# Patient Record
Sex: FEMALE | Race: WHITE | NOT HISPANIC OR LATINO | Employment: FULL TIME | URBAN - METROPOLITAN AREA
[De-identification: names, ages, dates, MRNs, and addresses within clinical notes are randomized per-mention and may not be internally consistent; named-entity substitution may affect disease eponyms.]

---

## 2021-04-15 ENCOUNTER — OFFICE VISIT (OUTPATIENT)
Dept: INTERNAL MEDICINE | Facility: CLINIC | Age: 56
End: 2021-04-15
Payer: OTHER GOVERNMENT

## 2021-04-15 VITALS
SYSTOLIC BLOOD PRESSURE: 108 MMHG | HEART RATE: 92 BPM | DIASTOLIC BLOOD PRESSURE: 72 MMHG | TEMPERATURE: 98.6 F | BODY MASS INDEX: 31.67 KG/M2 | HEIGHT: 68 IN | OXYGEN SATURATION: 99 % | WEIGHT: 209 LBS

## 2021-04-15 DIAGNOSIS — J30.2 SEASONAL ALLERGIES: ICD-10-CM

## 2021-04-15 DIAGNOSIS — I10 ESSENTIAL HYPERTENSION: Primary | ICD-10-CM

## 2021-04-15 DIAGNOSIS — R07.89 CHEST DISCOMFORT: ICD-10-CM

## 2021-04-15 PROCEDURE — 99203 OFFICE O/P NEW LOW 30 MIN: CPT | Performed by: INTERNAL MEDICINE

## 2021-04-15 PROCEDURE — 3008F BODY MASS INDEX DOCD: CPT | Performed by: INTERNAL MEDICINE

## 2021-04-15 RX ORDER — HYDROCHLOROTHIAZIDE 25 MG/1
25 TABLET ORAL
COMMUNITY
Start: 2021-01-26 | End: 2021-05-18 | Stop reason: ALTCHOICE

## 2021-04-15 RX ORDER — LOSARTAN POTASSIUM 25 MG/1
25 TABLET ORAL DAILY
Qty: 90 TABLET | Refills: 1 | Status: SHIPPED | OUTPATIENT
Start: 2021-04-15 | End: 2021-05-04

## 2021-04-15 RX ORDER — MONTELUKAST SODIUM 10 MG/1
10 TABLET ORAL DAILY
COMMUNITY

## 2021-04-15 RX ORDER — ALBUTEROL SULFATE 90 UG/1
1-2 INHALANT RESPIRATORY (INHALATION) EVERY 4 HOURS PRN
COMMUNITY
Start: 2021-01-20 | End: 2021-05-04

## 2021-04-15 RX ORDER — LEVOCETIRIZINE DIHYDROCHLORIDE 5 MG/1
5 TABLET, FILM COATED ORAL DAILY
COMMUNITY
End: 2021-09-08 | Stop reason: ALTCHOICE

## 2021-04-15 NOTE — PROGRESS NOTES
Subjective      Patient ID: Preeti Hyde is a 55 y.o. female.    HPI   She is here to establish care with a new doctor - has hx bronchitis and allergies - hypertension   On HCTZ .  Recent blood pressures are in the 146/96 range today.  Recently had some chest discomfort for a day - occurred in a stresful situation and has not had a recurrence.. Father with CAD in his 50's - mother with non hodgkins lymphoma. Had chemical stress test in 2018. Is very stressed on on her job.    The following have been reviewed and updated as appropriate in this visit:  Allergies  Meds  Problems       Past Medical History:   Diagnosis Date   • Allergies    • GERD (gastroesophageal reflux disease)    • Hypertension      Past Surgical History:   Procedure Laterality Date   • TONSILLECTOMY     • WISDOM TOOTH EXTRACTION       Family History   Problem Relation Age of Onset   • Lymphoma Biological Mother    • Heart attack Biological Father    • Aortic aneurysm Biological Father    • Hypertension Biological Father      Social History     Socioeconomic History   • Marital status:      Spouse name: Not on file   • Number of children: Not on file   • Years of education: Not on file   • Highest education level: Not on file   Occupational History   • Not on file   Tobacco Use   • Smoking status: Never Smoker   • Smokeless tobacco: Never Used   Vaping Use   • Vaping Use: Never used   Substance and Sexual Activity   • Alcohol use: Yes   • Drug use: Never   • Sexual activity: Yes     Partners: Male   Other Topics Concern   • Not on file   Social History Narrative   • Not on file     Social Determinants of Health     Financial Resource Strain:    • Difficulty of Paying Living Expenses:    Food Insecurity:    • Worried About Running Out of Food in the Last Year:    • Ran Out of Food in the Last Year:    Transportation Needs:    • Lack of Transportation (Medical):    • Lack of Transportation (Non-Medical):    Physical Activity:    • Days of  "Exercise per Week:    • Minutes of Exercise per Session:    Stress:    • Feeling of Stress :    Social Connections:    • Frequency of Communication with Friends and Family:    • Frequency of Social Gatherings with Friends and Family:    • Attends Sabianist Services:    • Active Member of Clubs or Organizations:    • Attends Club or Organization Meetings:    • Marital Status:    Intimate Partner Violence:    • Fear of Current or Ex-Partner:    • Emotionally Abused:    • Physically Abused:    • Sexually Abused:        Review of Systems    Allergies   Allergen Reactions   • Demerol [Meperidine] Other (see comments)     Flushing after endoscopy   • Pneumococcal Vaccine Other (see comments)     High fever     Current Outpatient Medications   Medication Sig Dispense Refill   • albuterol HFA (PROAIR HFA) 90 mcg/actuation inhaler Inhale 1-2 puffs every 4 (four) hours as needed.     • hydrochlorothiazide (HYDRODIURIL) 25 mg tablet Take 25 mg by mouth once daily.     • levocetirizine (XYZAL) 5 mg tablet Take 5 mg by mouth daily.     • montelukast (SINGULAIR) 10 mg tablet Take 10 mg by mouth daily.     • losartan (COZAAR) 25 mg tablet Take 1 tablet (25 mg total) by mouth daily. 90 tablet 1     No current facility-administered medications for this visit.       Objective   Vitals:    04/15/21 0827   BP: 108/72   BP Location: Right upper arm   Patient Position: Sitting   Pulse: 92   Temp: 37 °C (98.6 °F)   SpO2: 99%   Weight: 94.8 kg (209 lb)   Height: 1.727 m (5' 8\")       Physical Exam  Vitals and nursing note reviewed.   Constitutional:       Appearance: She is well-developed.   HENT:      Head: Normocephalic and atraumatic.   Neck:      Thyroid: No thyromegaly.   Cardiovascular:      Rate and Rhythm: Normal rate and regular rhythm.      Heart sounds: Normal heart sounds. No murmur heard.     Pulmonary:      Effort: Pulmonary effort is normal.      Breath sounds: Normal breath sounds.   Abdominal:      General: Bowel sounds " are normal.      Palpations: Abdomen is soft. There is no mass.      Tenderness: There is no abdominal tenderness.   Musculoskeletal:      Cervical back: Normal range of motion and neck supple.   Skin:     General: Skin is warm and dry.   Neurological:      Mental Status: She is alert and oriented to person, place, and time.   Psychiatric:         Behavior: Behavior normal.         Thought Content: Thought content normal.         Judgment: Judgment normal.         Assessment/Plan   Problem List Items Addressed This Visit        Nervous    Chest discomfort     I suspect her episode of chest discomfort was esophageal spasm however need to r/o cardiac ischemia given her hx hypertension and family hx CAD - will follow with DR.Andrea Blount of cardiology            Circulatory    Essential hypertension - Primary     Blood pressure is borderline - will continue losartan and have her send me her blood pressure readings over the next several weeks- goal is less than 130/80 - maintain low sodium diet         Relevant Medications    hydrochlorothiazide (HYDRODIURIL) 25 mg tablet    losartan (COZAAR) 25 mg tablet    Other Relevant Orders    CBC    Comprehensive metabolic panel    Lipid panel    TSH 3rd Generation       Other    Seasonal allergies     Has multiple episodes of bronchitis - will have her follow with dr.Alyson Morrow of allergy/immunology  Consider quantitative immunoglobulins  Continue xyzal/montelukast and albuterol inhaler prn               Rosa Smith MD    4/17/2021

## 2021-04-15 NOTE — PATIENT INSTRUCTIONS
Follow with Dr. Guicho Blount Send me your blood pressure readings in the next two weeks   Goal is less than 130/80  Dr. Agrawal ( ortho - foot)   Dr. Sumi Morrow of allergy / immunology

## 2021-04-17 PROBLEM — R07.89 CHEST DISCOMFORT: Status: ACTIVE | Noted: 2021-04-17

## 2021-04-17 PROBLEM — J30.2 SEASONAL ALLERGIES: Status: ACTIVE | Noted: 2021-04-17

## 2021-04-17 NOTE — ASSESSMENT & PLAN NOTE
Has multiple episodes of bronchitis - will have her follow with dr.Alyson Morrow of allergy/immunology  Consider quantitative immunoglobulins  Continue xyzal/montelukast and albuterol inhaler prn

## 2021-04-17 NOTE — ASSESSMENT & PLAN NOTE
Blood pressure is borderline - will continue losartan and have her send me her blood pressure readings over the next several weeks- goal is less than 130/80 - maintain low sodium diet

## 2021-04-17 NOTE — ASSESSMENT & PLAN NOTE
I suspect her episode of chest discomfort was esophageal spasm however need to r/o cardiac ischemia given her hx hypertension and family hx CAD - will follow with DR.Andrea Blount of cardiology

## 2021-04-20 ENCOUNTER — TELEPHONE (OUTPATIENT)
Dept: SCHEDULING | Facility: CLINIC | Age: 56
End: 2021-04-20

## 2021-04-20 NOTE — TELEPHONE ENCOUNTER
New Patient Appointment Request    Name of caller: Preeti Hyde    Diagnosis: Cardiac eval with stress test    Referred by: PCP    Previous Cardiologist name and phone number:NA    Best contact number:P#[phone]    Additional notes: Pt appt was not schd b/c pt states her pcp wants her to get stress test. No orders in chart.   Pt wants to be seen in [facility]

## 2021-04-21 NOTE — TELEPHONE ENCOUNTER
Called and LMOM for pt to call me back to pick which day and time is good for her NPV, offered May 25th @ 1:30 or May 27th either 9am or 1:30. Also let her know Dr. Blount will not order a stress test until after she is seen for her NPV

## 2021-04-21 NOTE — TELEPHONE ENCOUNTER
Pt returning missed  Call pt states she works at [id number] and hard to take calls. Pt states ok to leave detailed msg .   Pt #[phone]

## 2021-04-22 NOTE — TELEPHONE ENCOUNTER
Patient called me back and I was able to make her new patient visit on May 27th @ 9am, new patient visit paperwork has been mailed out and emailed

## 2021-05-04 ENCOUNTER — APPOINTMENT (EMERGENCY)
Dept: RADIOLOGY | Facility: HOSPITAL | Age: 56
End: 2021-05-04
Attending: EMERGENCY MEDICINE
Payer: OTHER GOVERNMENT

## 2021-05-04 ENCOUNTER — HOSPITAL ENCOUNTER (EMERGENCY)
Facility: HOSPITAL | Age: 56
Discharge: HOME | End: 2021-05-04
Attending: EMERGENCY MEDICINE
Payer: OTHER GOVERNMENT

## 2021-05-04 VITALS
TEMPERATURE: 98.6 F | DIASTOLIC BLOOD PRESSURE: 62 MMHG | WEIGHT: 200 LBS | HEIGHT: 68 IN | OXYGEN SATURATION: 98 % | HEART RATE: 79 BPM | SYSTOLIC BLOOD PRESSURE: 158 MMHG | BODY MASS INDEX: 30.31 KG/M2 | RESPIRATION RATE: 16 BRPM

## 2021-05-04 DIAGNOSIS — R31.9 URINARY TRACT INFECTION WITH HEMATURIA, SITE UNSPECIFIED: Primary | ICD-10-CM

## 2021-05-04 DIAGNOSIS — N39.0 URINARY TRACT INFECTION WITH HEMATURIA, SITE UNSPECIFIED: Primary | ICD-10-CM

## 2021-05-04 LAB
ALBUMIN SERPL-MCNC: 4.3 G/DL (ref 3.4–5)
ALP SERPL-CCNC: 55 IU/L (ref 35–126)
ALT SERPL-CCNC: 26 IU/L (ref 11–54)
ANION GAP SERPL CALC-SCNC: 10 MEQ/L (ref 3–15)
AST SERPL-CCNC: 24 IU/L (ref 15–41)
BACTERIA URNS QL MICRO: ABNORMAL /HPF
BASOPHILS # BLD: 0.05 K/UL (ref 0.01–0.1)
BASOPHILS NFR BLD: 0.8 %
BILIRUB SERPL-MCNC: 0.4 MG/DL (ref 0.3–1.2)
BILIRUB UR QL STRIP.AUTO: NEGATIVE MG/DL
BUN SERPL-MCNC: 20 MG/DL (ref 8–20)
CALCIUM SERPL-MCNC: 8.6 MG/DL (ref 8.9–10.3)
CHLORIDE SERPL-SCNC: 101 MEQ/L (ref 98–109)
CLARITY UR REFRACT.AUTO: CLEAR
CO2 SERPL-SCNC: 26 MEQ/L (ref 22–32)
COLOR UR AUTO: YELLOW
CREAT SERPL-MCNC: 0.9 MG/DL (ref 0.6–1.1)
DIFFERENTIAL METHOD BLD: NORMAL
EOSINOPHIL # BLD: 0.2 K/UL (ref 0.04–0.36)
EOSINOPHIL NFR BLD: 3.4 %
ERYTHROCYTE [DISTWIDTH] IN BLOOD BY AUTOMATED COUNT: 12.3 % (ref 11.7–14.4)
GFR SERPL CREATININE-BSD FRML MDRD: >60 ML/MIN/1.73M*2
GLUCOSE SERPL-MCNC: 105 MG/DL (ref 70–99)
GLUCOSE UR STRIP.AUTO-MCNC: NEGATIVE MG/DL
HCT VFR BLDCO AUTO: 43.6 % (ref 35–45)
HGB BLD-MCNC: 14.7 G/DL (ref 11.8–15.7)
HGB UR QL STRIP.AUTO: NEGATIVE
HYALINE CASTS #/AREA URNS LPF: ABNORMAL /LPF
IMM GRANULOCYTES # BLD AUTO: 0.01 K/UL (ref 0–0.08)
IMM GRANULOCYTES NFR BLD AUTO: 0.2 %
KETONES UR STRIP.AUTO-MCNC: NEGATIVE MG/DL
LEUKOCYTE ESTERASE UR QL STRIP.AUTO: ABNORMAL
LYMPHOCYTES # BLD: 1.96 K/UL (ref 1.2–3.5)
LYMPHOCYTES NFR BLD: 33 %
MCH RBC QN AUTO: 31.9 PG (ref 28–33.2)
MCHC RBC AUTO-ENTMCNC: 33.7 G/DL (ref 32.2–35.5)
MCV RBC AUTO: 94.6 FL (ref 83–98)
MONOCYTES # BLD: 0.38 K/UL (ref 0.28–0.8)
MONOCYTES NFR BLD: 6.4 %
NEUTROPHILS # BLD: 3.34 K/UL (ref 1.7–7)
NEUTS SEG NFR BLD: 56.2 %
NITRITE UR QL STRIP.AUTO: NEGATIVE
NRBC BLD-RTO: 0 %
PDW BLD AUTO: 10.6 FL (ref 9.4–12.3)
PH UR STRIP.AUTO: 6 [PH]
PLATELET # BLD AUTO: 301 K/UL (ref 150–369)
POTASSIUM SERPL-SCNC: 3.3 MEQ/L (ref 3.6–5.1)
PROT SERPL-MCNC: 6.6 G/DL (ref 6–8.2)
PROT UR QL STRIP.AUTO: NEGATIVE
RBC # BLD AUTO: 4.61 M/UL (ref 3.93–5.22)
RBC #/AREA URNS HPF: ABNORMAL /HPF
SODIUM SERPL-SCNC: 137 MEQ/L (ref 136–144)
SP GR UR REFRACT.AUTO: 1.03
SQUAMOUS URNS QL MICRO: 1 /HPF
UROBILINOGEN UR STRIP-ACNC: 0.2 EU/DL
WBC # BLD AUTO: 5.94 K/UL (ref 3.8–10.5)
WBC #/AREA URNS HPF: ABNORMAL /HPF

## 2021-05-04 PROCEDURE — 87086 URINE CULTURE/COLONY COUNT: CPT | Performed by: EMERGENCY MEDICINE

## 2021-05-04 PROCEDURE — 81001 URINALYSIS AUTO W/SCOPE: CPT | Performed by: EMERGENCY MEDICINE

## 2021-05-04 PROCEDURE — 63600105 HC IODINE BASED CONTRAST: Performed by: EMERGENCY MEDICINE

## 2021-05-04 PROCEDURE — 80053 COMPREHEN METABOLIC PANEL: CPT

## 2021-05-04 PROCEDURE — 85025 COMPLETE CBC W/AUTO DIFF WBC: CPT | Performed by: EMERGENCY MEDICINE

## 2021-05-04 PROCEDURE — 85025 COMPLETE CBC W/AUTO DIFF WBC: CPT

## 2021-05-04 PROCEDURE — 63700000 HC SELF-ADMINISTRABLE DRUG: Performed by: EMERGENCY MEDICINE

## 2021-05-04 PROCEDURE — 81003 URINALYSIS AUTO W/O SCOPE: CPT | Performed by: EMERGENCY MEDICINE

## 2021-05-04 PROCEDURE — G1004 CDSM NDSC: HCPCS

## 2021-05-04 PROCEDURE — 99284 EMERGENCY DEPT VISIT MOD MDM: CPT | Mod: 25

## 2021-05-04 PROCEDURE — 36415 COLL VENOUS BLD VENIPUNCTURE: CPT

## 2021-05-04 PROCEDURE — 80053 COMPREHEN METABOLIC PANEL: CPT | Performed by: EMERGENCY MEDICINE

## 2021-05-04 RX ORDER — CEFUROXIME AXETIL 500 MG/1
500 TABLET ORAL 2 TIMES DAILY
Qty: 14 TABLET | Refills: 0 | Status: SHIPPED | OUTPATIENT
Start: 2021-05-04 | End: 2021-05-11

## 2021-05-04 RX ORDER — CEFUROXIME AXETIL 250 MG/1
500 TABLET ORAL EVERY 12 HOURS
Status: DISCONTINUED | OUTPATIENT
Start: 2021-05-04 | End: 2021-05-05 | Stop reason: HOSPADM

## 2021-05-04 RX ORDER — PHENAZOPYRIDINE HYDROCHLORIDE 95 MG/1
95 TABLET ORAL
Status: DISCONTINUED | OUTPATIENT
Start: 2021-05-04 | End: 2021-05-05 | Stop reason: HOSPADM

## 2021-05-04 RX ORDER — PHENAZOPYRIDINE HYDROCHLORIDE 200 MG/1
200 TABLET, FILM COATED ORAL 3 TIMES DAILY
Qty: 6 TABLET | Refills: 0 | Status: SHIPPED | OUTPATIENT
Start: 2021-05-04 | End: 2021-05-06

## 2021-05-04 RX ADMIN — CEFUROXIME AXETIL 500 MG: 250 TABLET ORAL at 22:32

## 2021-05-04 RX ADMIN — PHENAZOPYRIDINE HYDROCHLORIDE 95 MG: 95 TABLET ORAL at 22:32

## 2021-05-04 RX ADMIN — IOHEXOL 120 ML: 300 INJECTION, SOLUTION INTRAVENOUS at 21:25

## 2021-05-04 ASSESSMENT — ENCOUNTER SYMPTOMS
WEAKNESS: 0
CHILLS: 0
FREQUENCY: 1
SHORTNESS OF BREATH: 0
FLANK PAIN: 0
DIARRHEA: 0
DYSURIA: 0
VOMITING: 0
NUMBNESS: 0
HEMATURIA: 0
ABDOMINAL PAIN: 1
FEVER: 0
COLOR CHANGE: 0
BACK PAIN: 0
COUGH: 0

## 2021-05-05 ENCOUNTER — TELEPHONE (OUTPATIENT)
Dept: INTERNAL MEDICINE | Facility: CLINIC | Age: 56
End: 2021-05-05

## 2021-05-05 NOTE — DISCHARGE INSTRUCTIONS
You were treated in the ER today for urinary urgency and abdominal pain.  You had evaluation in the ER that included a urinalysis and a CT scan of your abdomen and blood work.  As discussed, this is likely still continued urinary tract infection, CAT scan was negative for any more extensive involvement like infection of your kidneys.    You were given a different class of antibiotics to take.  Continue taking this as directed for the next 7 days.  Use the Pyridium for the next 2 days as needed.    Follow-up with your PCP when you return home later this week; additionally be referred to urologist if they see necessary in your hometown.    Return to the ER for new or worsening symptoms.

## 2021-05-05 NOTE — ED ATTESTATION NOTE
The patient was evaluated and managed by the physician assistant / nurse practitioner.  55-year-old female seen in urgent care.  She was diagnosed with a UTI.  She was seen recently was treated for UTI and was feeling better however the symptoms have returned.  I discussed the case with the physician assistant.  Went over history and physical findings.  Will check urine and send off in a specimen.  In the meantime will treat.     Hitesh Harry MD  05/04/21 0420

## 2021-05-05 NOTE — ED PROVIDER NOTES
Emergency Medicine Note  HPI   HISTORY OF PRESENT ILLNESS     55-year-old female with significant PMH of HTN, GERD, and allergies with a recent urinary tract infection presents to the ED today complaining of persistent urinary urgency and frequency and lower abdominal pain.  4/25 patient was evaluated in urgent care and diagnosed with urinary tract infection; she completed 7 days of Bactrim DS twice daily.  She initially took Pyridium for the first 2 days as well; reports that her urgency and frequency significantly improved but then returned 2 to 3 days ago.  She had another visit at an urgent care on 5/2 and started taking Macrobid; she has only taken this for about 1 day but has persistent urgency which prompted her ED visit.  [place] [facility] but is frequently in this area for work.  She reports that her lower abdominal pain is a minor pressure feeling.  Denies it to radiate to her back or any pain in her flank, denies dysuria/hematuria, fever/chills, nausea or vomiting, chest pain or trouble breathing, changes in bowel.            Patient History   PAST HISTORY     Reviewed from Nursing Triage:      Past Medical History:   Diagnosis Date   • Allergies    • GERD (gastroesophageal reflux disease)    • Hypertension        Past Surgical History:   Procedure Laterality Date   • TONSILLECTOMY     • WISDOM TOOTH EXTRACTION         Family History   Problem Relation Age of Onset   • Lymphoma Biological Mother    • Heart attack Biological Father    • Aortic aneurysm Biological Father    • Hypertension Biological Father        Social History     Tobacco Use   • Smoking status: Never Smoker   • Smokeless tobacco: Never Used   Vaping Use   • Vaping Use: Never used   Substance Use Topics   • Alcohol use: Yes   • Drug use: Never         Review of Systems   REVIEW OF SYSTEMS     Review of Systems   Constitutional: Negative for chills and fever.   Respiratory: Negative for cough and  shortness of breath.    Cardiovascular: Negative for chest pain.   Gastrointestinal: Positive for abdominal pain. Negative for diarrhea and vomiting.   Genitourinary: Positive for frequency. Negative for dysuria, flank pain and hematuria.   Musculoskeletal: Negative for back pain.   Skin: Negative for color change.   Neurological: Negative for weakness and numbness.         VITALS     ED Vitals    Date/Time Temp Pulse Resp BP SpO2 [facility]   05/04/21 2234 37 °C (98.6 °F) 79 16 158/62 98 % [facility]   05/04/21 2022 36.3 °C (97.4 °F) 81 18 162/67 97 % [facility]        Pulse Ox %: 97 % (05/04/21 2230)  Pulse Ox Interpretation: Normal (05/04/21 2230)  Heart Rate: 81 (05/04/21 2230)        Physical Exam   PHYSICAL EXAM     Physical Exam  Vitals and nursing note reviewed.   Constitutional:       General: She is not in acute distress.     Appearance: She is well-developed.      Comments: Well-appearing; in NAD   HENT:      Head: Normocephalic and atraumatic.   Eyes:      Conjunctiva/sclera: Conjunctivae normal.   Cardiovascular:      Rate and Rhythm: Normal rate and regular rhythm.      Heart sounds: No murmur heard.     Pulmonary:      Effort: Pulmonary effort is normal. No respiratory distress.      Breath sounds: Normal breath sounds.   Abdominal:      Palpations: Abdomen is soft.      Tenderness: There is no abdominal tenderness.      Comments: Soft nondistended  Lower abdominal tenderness without rebound or guarding   Musculoskeletal:         General: No swelling.      Cervical back: Neck supple.      Comments: No CVA tenderness bilaterally   Skin:     General: Skin is warm and dry.   Neurological:      General: No focal deficit present.      Mental Status: She is alert and oriented to person, place, and time.           PROCEDURES     Procedures     DATA     Results     Procedure Component Value Units Date/Time    UA with reflex culture [[id number]]  (Abnormal) Collected: 05/04/21 2043    Specimen: Urine, Clean Catch Updated: 05/04/21  2107    Narrative:      The following orders were created for panel order UA with reflex culture.  Procedure                               Abnormality         Status                     ---------                               -----------         ------                     UA Reflex to Culture (Ma...[[id number]]  Abnormal            Final result               UA Microscopic[[id number]]               Abnormal            Final result                 Please view results for these tests on the individual orders.    UA Microscopic [[id number]]  (Abnormal) Collected: 05/04/21 2043    Specimen: Urine, Clean Catch Updated: 05/04/21 2107     RBC, Urine 0 TO 4 /HPF      WBC, Urine 10 TO 20 /HPF      Squamous Epithelial +1 /hpf      Hyaline Cast 0 TO 2 /lpf      Bacteria, Urine None Seen /HPF     UA Reflex to Culture (Macroscopic) [[id number]]  (Abnormal) Collected: 05/04/21 2043    Specimen: Urine, Clean Catch Updated: 05/04/21 2105     Color, Urine Yellow     Clarity, Urine Clear     Specific Gravity, Urine 1.026     pH, Urine 6.0     Leukocyte Esterase Trace     Nitrite, Urine Negative     Protein, Urine Negative     Glucose, Urine Negative mg/dL      Ketones, Urine Negative mg/dL      Urobilinogen, Urine 0.2 EU/dL      Bilirubin, Urine Negative mg/dL      Blood, Urine Negative     Comment: The sensitivity of the occult blood test is equivalent to approximately 4 intact RBC/HPF.       Comprehensive metabolic panel [[id number]]  (Abnormal) Collected: 05/04/21 2024    Specimen: Blood, Venous Updated: 05/04/21 2059     Sodium 137 mEQ/L      Potassium 3.3 mEQ/L      Comment: Results obtained on plasma. Plasma Potassium values may be up to 0.4 mEQ/L less than serum values. The differences may be greater for patients with high platelet or white cell counts.        Chloride 101 mEQ/L      CO2 26 mEQ/L      BUN 20 mg/dL      Creatinine 0.9 mg/dL      Glucose 105 mg/dL      Calcium 8.6 mg/dL      AST (SGOT) 24 IU/L      ALT (SGPT) 26 IU/L       Alkaline Phosphatase 55 IU/L      Total Protein 6.6 g/dL      Comment: Test performed on plasma which typically contains approximately 0.4 g/dL more protein than serum.        Albumin 4.3 g/dL      Bilirubin, Total 0.4 mg/dL      eGFR >60.0 mL/min/1.73m*2      Anion Gap 10 mEQ/L     CBC and differential [[id number]] Collected: 05/04/21 2024    Specimen: Blood, Venous Updated: 05/04/21 2040     WBC 5.94 K/uL      RBC 4.61 M/uL      Hemoglobin 14.7 g/dL      Hematocrit 43.6 %      MCV 94.6 fL      MCH 31.9 pg      MCHC 33.7 g/dL      RDW 12.3 %      Platelets 301 K/uL      MPV 10.6 fL      Differential Type Auto     nRBC 0.0 %      Immature Granulocytes 0.2 %      Neutrophils 56.2 %      Lymphocytes 33.0 %      Monocytes 6.4 %      Eosinophils 3.4 %      Basophils 0.8 %      Immature Granulocytes, Absolute 0.01 K/uL      Neutrophils, Absolute 3.34 K/uL      Lymphocytes, Absolute 1.96 K/uL      Monocytes, Absolute 0.38 K/uL      Eosinophils, Absolute 0.20 K/uL      Basophils, Absolute 0.05 K/uL     RAINBOW LT GREEN [[id number]] Collected: 05/04/21 2024    Specimen: Blood, Venous Updated: 05/04/21 2027    RAINBOW GOLD [[id number]] Collected: 05/04/21 2024    Specimen: Blood, Venous Updated: 05/04/21 2027    RAINBOW RED [[id number]] Collected: 05/04/21 2024    Specimen: Blood, Venous Updated: 05/04/21 2027    [facility] Draw Panel [[id number]] Collected: 05/04/21 2024    Specimen: Blood, Venous Updated: 05/04/21 2027    Narrative:      The following orders were created for panel order [facility] Draw Panel.  Procedure                               Abnormality         Status                     ---------                               -----------         ------                     RAINBOW RED[[id number]]                                      In process                 RAINBOW LT BLUE[[id number]]                                  In process                 RAINBOW LT GREEN[[id number]]                                 In process                  RAINBOW GOLD[[id number]]                                     In process                   Please view results for these tests on the individual orders.    [facility] [place]A [[id number]] Collected: 05/04/21 2024    Specimen: Blood, Venous Updated: 05/04/21 2027          Imaging Results          CT ABDOMEN PELVIS WITH IV CONTRAST (Final result)  Result time 05/04/21 22:22:15    Final result                 Impression:    IMPRESSION:  No masses or inflammatory changes.               Narrative:    CLINICAL HISTORY: Persistent urinary tract symptoms.  Lower abdominal pain.    COMMENT: Contiguous axial CT images of the abdomen and pelvis are performed with  120 cc of intravenous Omnipaque 350 and without oral contrast. Sagittal and  coronal reformatted images are also created.    COMPARISON:  None    CT DOSE: One or more dose reduction technique (e.g.automated exposure control,  adjustment of the kV and/or mA according to the patient's size, use of iterative  reconstruction technique) utilized for this examination.    Visualized lung bases are clear.    The liver, spleen, pancreas, bilateral adrenal glands and kidneys are without  mass lesions.  Bilateral kidneys enhances homogeneously.  No fat stranding is  seen around the bilateral kidneys.  There is no CT evidence of gallstones.    Urinary bladder is normal in appearance. The uterus and the area of the ureters  appear normal.. There is no bowel wall thickening or distended loops of bowel.  The appendix is normal.  No mesenteric stranding is visualized. No free fluid is  seen.  No adenopathy is visualized.                                No orders to display       Scoring tools                                 ED Course & MDM   MDM / ED COURSE and CLINICAL IMPRESSIONS     [facility]    ED Course as of May 04 2314   Tue May 04, 2021   2225 Impression: Urinary urgency and frequency returning after completing an ABX several days ago for UTI.  Associated low bladder  pressure.  Plan: IV, labs, urinalysis, CT abdomen pelvis; observe    [BB]   2225 Discussed patient plan of care with attending Piero; will place patient on Ceftin and Pyridium; follow-up her PCP/urologist [place].  Discussed red flags to return in ED.  I provided patient with a copy of her lab results and CT imaging.  First doses of meds given in ED and paper scripts.  I removed her IV and discharged her from the waiting room.  Offered for her to be evaluated by the attending and she declined preferring to go home at this time.    [BB]      ED Course User Index  [BB] Christy Pierson PA C         Clinical Impressions as of May 04 2314   Urinary tract infection with hematuria, site unspecified            Christy Pierson PA C  05/04/21 5701

## 2021-05-05 NOTE — TELEPHONE ENCOUNTER
I spoke to the patient today to perform ED telephone f/u call.  The patient presented to the ED at  for symptoms of persistent urinary urgency, frequency and lower abdominal pain.  The patient also stated that she had taken pyridium for 2 days prior to her urgent care visit which did show some improvement in her symptoms.  The patient stated she was seen at an urgent care on 4/25/21 and diagnosed with UTI and given Bactrim.  The patient stated that her symptoms have become worse and having some worsening lower abdominal pain.  The patient had another UA done and was told that the bactrim was not the first choice antibiotic so they are switching her to Ceftin.  The patient took one dose last night and is not feeling that much better.  The patient stated that she was told it will take a couple of days on the new antibiotic for her symptoms to resolve.  I told the patient should her symptoms not resolve or she feels worse she should contact our office immediately to schedule an appt with our office either in person or by telemedicine since she does live far away.  The patient also stated that she was told that her labs showed that she is borderline dehydrated and wondered if she should be concerned because she is taking hydrochlorothiazide.  I told the patient I will ask Dr. Lechuga but in the mean time she should try to increase her fluid intake.  The patient will call back in a couple of days if not improved.  The patient also wanted to let Dr. Lechuga know that [facility].  The patient is also scheduled to see Dr. Blount Cardiology this month.  They will schedule any needed stress testing after she sees Dr. Blount.

## 2021-05-06 LAB — BACTERIA UR CULT: NORMAL

## 2021-05-06 RX ORDER — BUPROPION HYDROCHLORIDE 150 MG/1
150 TABLET ORAL DAILY
Qty: 90 TABLET | Refills: 3 | Status: SHIPPED | OUTPATIENT
Start: 2021-05-06 | End: 2021-06-23

## 2021-05-06 NOTE — TELEPHONE ENCOUNTER
The patient called back today and stated that she is feeling somewhat better and does feel like her UTI symptoms are resolving with the new antibiotic.  The patient stated that she is going through a stressful time right now with her job and at her appt Dr. Chacon told her to think about maybe taking something low dose to help her take the edge off.  The patient stated that her previous doctor had prescribed Effexor 37.5 mg one daily for her when she was going through a stressful situation in the past.  The patient wants to know if this medication will effect her weight.  If not, the patient asked if Dr. Chacon could send this to her [facility] in [place].  If this will effect her weight the patient asked if Dr Chacon could prescribe something else low dose that will not effect her weight.   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  Patient asked that we call her back with what Dr. Chacon will prescribe.

## 2021-05-07 NOTE — TELEPHONE ENCOUNTER
It is unlikely that effexor will cause weight gain at that dose but it can if we incresae it bupropion is more weight neutral I will send this in at 150 mg daily

## 2021-05-10 ENCOUNTER — HOSPITAL ENCOUNTER (EMERGENCY)
Facility: HOSPITAL | Age: 56
Discharge: HOME | End: 2021-05-10
Attending: EMERGENCY MEDICINE
Payer: OTHER GOVERNMENT

## 2021-05-10 VITALS
HEIGHT: 68 IN | OXYGEN SATURATION: 99 % | BODY MASS INDEX: 30.31 KG/M2 | SYSTOLIC BLOOD PRESSURE: 148 MMHG | RESPIRATION RATE: 18 BRPM | WEIGHT: 200 LBS | TEMPERATURE: 97.9 F | HEART RATE: 69 BPM | DIASTOLIC BLOOD PRESSURE: 76 MMHG

## 2021-05-10 DIAGNOSIS — R35.0 URINARY FREQUENCY: Primary | ICD-10-CM

## 2021-05-10 LAB
BILIRUB UR QL STRIP.AUTO: NEGATIVE MG/DL
CLARITY UR REFRACT.AUTO: CLEAR
COLOR UR AUTO: YELLOW
GLUCOSE UR STRIP.AUTO-MCNC: NEGATIVE MG/DL
HGB UR QL STRIP.AUTO: NEGATIVE
KETONES UR STRIP.AUTO-MCNC: NEGATIVE MG/DL
LEUKOCYTE ESTERASE UR QL STRIP.AUTO: NEGATIVE
NITRITE UR QL STRIP.AUTO: NEGATIVE
PH UR STRIP.AUTO: 7 [PH]
PROT UR QL STRIP.AUTO: NEGATIVE
SP GR UR REFRACT.AUTO: 1.01
UROBILINOGEN UR STRIP-ACNC: 0.2 EU/DL

## 2021-05-10 PROCEDURE — 99283 EMERGENCY DEPT VISIT LOW MDM: CPT

## 2021-05-10 PROCEDURE — 81003 URINALYSIS AUTO W/O SCOPE: CPT | Performed by: EMERGENCY MEDICINE

## 2021-05-10 PROCEDURE — 81003 URINALYSIS AUTO W/O SCOPE: CPT

## 2021-05-10 PROCEDURE — 63700000 HC SELF-ADMINISTRABLE DRUG: Performed by: PHYSICIAN ASSISTANT

## 2021-05-10 RX ORDER — PHENAZOPYRIDINE HYDROCHLORIDE 95 MG/1
95 TABLET ORAL ONCE
Status: COMPLETED | OUTPATIENT
Start: 2021-05-10 | End: 2021-05-10

## 2021-05-10 RX ORDER — PHENAZOPYRIDINE HYDROCHLORIDE 100 MG/1
100 TABLET, FILM COATED ORAL 3 TIMES DAILY PRN
COMMUNITY
End: 2021-05-27

## 2021-05-10 RX ORDER — PHENAZOPYRIDINE HYDROCHLORIDE 200 MG/1
200 TABLET, FILM COATED ORAL 3 TIMES DAILY PRN
Qty: 6 TABLET | Refills: 0 | Status: SHIPPED | OUTPATIENT
Start: 2021-05-10 | End: 2021-05-12

## 2021-05-10 RX ADMIN — PHENAZOPYRIDINE HYDROCHLORIDE 95 MG: 95 TABLET ORAL at 22:56

## 2021-05-11 ASSESSMENT — ENCOUNTER SYMPTOMS
DYSURIA: 0
WEAKNESS: 0
SHORTNESS OF BREATH: 0
FREQUENCY: 1
ABDOMINAL PAIN: 0
FEVER: 0
DIZZINESS: 0
CONFUSION: 0
BACK PAIN: 0

## 2021-05-11 NOTE — ED ATTESTATION NOTE
5/10/131704:56 PM  I reviewed and agree with the PA/NP/Resident's assessment and plan of care.       Guicho Wagner, DO  05/10/21 1859

## 2021-05-11 NOTE — ED PROVIDER NOTES
Emergency Medicine Note  HPI   HISTORY OF PRESENT ILLNESS     HPI     55-year-old female with history of hypertension presents with urinary frequency x 2.5 weeks.  She was valuated at urgent care initially prescribed antibiotics and Pyridium for UTI.  Patient improved but then symptoms returned about a week ago.  She was evaluated here in ED with normal CAT scan without kidney stones and placed on a second round of antibiotics.  Patient with continued urinary frequency and nocturia with pelvic pressure.  She is working on getting an appointment with urology this week.  Denies fevers, chills, burning with urination, constipation.     CT (5/4/21) - negative, no kidney stones  Urine culture (5/4/21) - no growth      Patient History   PAST HISTORY     Reviewed from Nursing Triage:      Past Medical History:   Diagnosis Date   • Allergies    • GERD (gastroesophageal reflux disease)    • Hypertension        Past Surgical History:   Procedure Laterality Date   • TONSILLECTOMY     • WISDOM TOOTH EXTRACTION         Family History   Problem Relation Age of Onset   • Lymphoma Biological Mother    • Heart attack Biological Father    • Aortic aneurysm Biological Father    • Hypertension Biological Father        Social History     Tobacco Use   • Smoking status: Never Smoker   • Smokeless tobacco: Never Used   Vaping Use   • Vaping Use: Never used   Substance Use Topics   • Alcohol use: Yes   • Drug use: Never         Review of Systems   REVIEW OF SYSTEMS     Review of Systems   Constitutional: Negative for fever.   HENT: Negative for congestion.    Eyes: Negative for visual disturbance.   Respiratory: Negative for shortness of breath.    Cardiovascular: Negative for chest pain.   Gastrointestinal: Negative for abdominal pain.   Genitourinary: Positive for frequency and pelvic pain. Negative for dysuria.   Musculoskeletal: Negative for back pain.   Skin: Negative for rash.   Neurological: Negative for dizziness and weakness.    Psychiatric/Behavioral: Negative for confusion.         VITALS     ED Vitals    Date/Time Temp Pulse Resp BP SpO2 [facility]   05/10/21 2258 36.6 °C (97.9 °F) 69 18 148/76 99 % NCB   05/10/21 2034 36.7 °C (98 °F) 76 16 158/96 98 % KLM        Pulse Ox %: 98 % (05/10/21 2250)  Pulse Ox Interpretation: Normal (05/10/21 2250)  Heart Rate: 76 (05/10/21 2250)        Physical Exam   PHYSICAL EXAM     Physical Exam  Vitals and nursing note reviewed.   Constitutional:       Appearance: She is well-developed.   HENT:      Head: Normocephalic and atraumatic.      Mouth/Throat:      Mouth: Mucous membranes are moist.   Eyes:      Extraocular Movements: Extraocular movements intact.   Cardiovascular:      Rate and Rhythm: Normal rate and regular rhythm.   Pulmonary:      Effort: Pulmonary effort is normal.      Breath sounds: Normal breath sounds.   Abdominal:      Palpations: Abdomen is soft.      Tenderness: There is no abdominal tenderness.   Musculoskeletal:         General: Normal range of motion.      Cervical back: Neck supple.   Skin:     General: Skin is warm and dry.   Neurological:      Mental Status: She is alert and oriented to person, place, and time.   Psychiatric:         Mood and Affect: Mood normal.           PROCEDURES     Procedures     DATA     Results     Procedure Component Value Units Date/Time    Urinalysis with Reflex Culture [[id number]]  (Normal) Collected: 05/10/21 2055    Specimen: Urine, Clean Catch Updated: 05/10/21 2131    Narrative:      The following orders were created for panel order Urinalysis with Reflex Culture.  Procedure                               Abnormality         Status                     ---------                               -----------         ------                     UA Reflex to Culture (Ma...[[id number]]  Normal              Final result                 Please view results for these tests on the individual orders.    UA Reflex to Culture (Macroscopic) [[id number]]  (Normal)  Collected: 05/10/21 2055    Specimen: Urine, Clean Catch Updated: 05/10/21 2131     Color, Urine Yellow     Clarity, Urine Clear     Specific Gravity, Urine 1.013     pH, Urine 7.0     Leukocyte Esterase Negative     Comment: Results can be falsely negative due to high specific gravity, some antibiotics, glucose >3 g/dl, or WBC other than neutrophils.        Nitrite, Urine Negative     Protein, Urine Negative     Glucose, Urine Negative mg/dL      Ketones, Urine Negative mg/dL      Urobilinogen, Urine 0.2 EU/dL      Bilirubin, Urine Negative mg/dL      Blood, Urine Negative     Comment: The sensitivity of the occult blood test is equivalent to approximately 4 intact RBC/HPF.             Imaging Results    None         No orders to display       Scoring tools                                 ED Course & MDM   MDM / ED COURSE and CLINICAL IMPRESSIONS     MDM    Clinical Impressions as of May 11 1706   Urinary frequency            Yumiko Zafar PA C  05/11/21 1706

## 2021-05-11 NOTE — DISCHARGE INSTRUCTIONS
Rest.  Drink plenty of fluids.  Take pyridium as directed.  Follow-up with urology this week.  Call for appointment.  Return to the emergency department if new or worsening symptoms develop.

## 2021-05-12 ENCOUNTER — TELEPHONE (OUTPATIENT)
Dept: INTERNAL MEDICINE | Facility: CLINIC | Age: 56
End: 2021-05-12

## 2021-05-12 NOTE — TELEPHONE ENCOUNTER
[facility] for patient today to perform ED telephone f/u call.  The patient presented to the ED at  for urinary frequency. This was the patient's second visit to the ED since May 4th for the same symptoms.  The patient was recommended to see urology and told to schedule f/u with Dr. Trey Anne of urology.  I asked the patient to call to let us know how she is doing and to check to see if she was able to schedule f/u with urology.

## 2021-05-12 NOTE — TELEPHONE ENCOUNTER
The patient called back today stating that she has been trying to get an appt with urology per recommendation of ED visits on 5/3 and 5/10.  The patient had a bladder infection and was taking antibiotics but still having these same issues and was told by the ED to f/u with urology. The patient stated that she called Dr. Anne's office of urology and was told she wouldn't be able to be seen for quite some time and she needs to be seen quickly.  I gave the patient a few more recommendations Dr. Yusuf Echavarria,  Dr. Spann at [facility], and Dr. Bay Melendez [place]. I provided the patient with the phone numbers for these providers as well.  In the meantime, the patient stated that the ED only gave her a few pills of pyridium for a couple of days and she is still very uncomfortable.  The patient was told by the ED that she should see the urologist right away so they can prescribe medication.  Since the patient is having difficulty getting in timely, she asked if Dr. Lechuga could send something to the pharmacy for her bladder issue.  She said she is very uncomfortable and having trouble sleeping because she is up so much at night.  The ED told the patient that pyridium is just a band aid and would need something different.  The patient asked if Dr. Lechuga could send something in for her until she is able to see urology.  The patient also provided me with BP readings from 4-21-21 to 5/12/21 all taken at approximately 6:30a: 4-/86, 4-/90, 4-/88, 4--89, 5-3-143/83,5-7-147/94, 5-8-101/89, 5-/88, 5-/85.

## 2021-05-18 ENCOUNTER — TELEMEDICINE (OUTPATIENT)
Dept: INTERNAL MEDICINE | Facility: CLINIC | Age: 56
End: 2021-05-18
Payer: OTHER GOVERNMENT

## 2021-05-18 DIAGNOSIS — R35.0 URINARY FREQUENCY: ICD-10-CM

## 2021-05-18 DIAGNOSIS — I10 ESSENTIAL HYPERTENSION: ICD-10-CM

## 2021-05-18 DIAGNOSIS — N30.00 ACUTE CYSTITIS WITHOUT HEMATURIA: Primary | ICD-10-CM

## 2021-05-18 PROCEDURE — 99214 OFFICE O/P EST MOD 30 MIN: CPT | Mod: GT | Performed by: INTERNAL MEDICINE

## 2021-05-18 RX ORDER — LISINOPRIL 10 MG/1
10 TABLET ORAL DAILY
Qty: 90 TABLET | Refills: 3 | Status: SHIPPED | OUTPATIENT
Start: 2021-05-18 | End: 2021-05-27

## 2021-05-18 ASSESSMENT — ENCOUNTER SYMPTOMS
HEMATURIA: 0
BACK PAIN: 0
SHORTNESS OF BREATH: 0
HEADACHES: 1
PALPITATIONS: 0
FEVER: 0
CHILLS: 0
ABDOMINAL PAIN: 0
FREQUENCY: 1
DYSURIA: 0
FATIGUE: 0

## 2021-05-18 NOTE — ASSESSMENT & PLAN NOTE
Given that she has urinary frequency and her blood pressures are still not well controlled on the diuretic we will stop this change to lisinopril 10 mg daily.  Continue with low-sodium diet.  She will monitor her blood pressures and send me results over the next several weeks.

## 2021-05-18 NOTE — PROGRESS NOTES
Verification of Patient Location:  The patient affirms they are currently located in the following state: [place]    Request for Consent:    Audio and Video Encounter   Hello, my name is Rosa YANDY Smith MD.  Before we proceed, can you please verify your identification by telling me your full name and date of birth?  Can you tell me who is in the room with you?    You and I are about to have a telemedicine check-in or visit because you have requested it.  This is a live video-conference.  I am a real person, speaking to you in real time.  There is no one else with me on the video-conference.  However, when we use ([employer], [employer], etc) it is important for you to know that the video-conference may not be secure or private.  I am not recording this conversation and I am asking you not to record it.  This telemedicine visit will be billed to your health insurance or you, if you are self-insured.  You understand you will be responsible for any copayments or coinsurances that apply to your telemedicine visit.  Communication platform used for this encounter:  [employer]     Before starting our telemedicine visit, I am required to get your consent for this virtual check-in or visit by telemedicine. Do you consent?      Patient Response to Request for Consent:  Yes      Visit Documentation:  Subjective     Patient ID: Preeti Hyde is a 55 y.o. female.  1965      HPI  This is a telemedicincine visit via [employer] - she had been seen at an urgent care in [facility] on April 25 for urinary frequency and dysuria.  Was diagnosed with a urinary tract infection treated with 7 days of Bactrim.  Her symptoms seem to resolve but then recurred within a couple days of stopping the Bactrim.  She went to urgent care again on May 2 and was given Macrobid for recurrent UTI continue with symptoms and on May 4 was seen in the emergency department for urinary frequency and urgency.  Was taken off of Macrobid and  given Ceftin and completed a course of that with some improvement in her symptoms but on May 10 she returned to the emergency room with continued frequency and urgency did not have any dysuria back pain fever chills.  Urinalysis at that point was negative although early on in all of this she did have some white cells in her urine prior to taking the Bactrim.  While in the ER she had a CT scan of the abdomen and pelvis which did not reveal any evidence of renal stones she did not have any blood in her urine and no gynecologic abnormalities.  She denies any vaginal burning or discharge.  Was also treated with Diflucan and a probiotic for possible yeast.  Currently is feeling somewhat better but still has significant urinary urgency.  Her frequency is less.  She has an appointment with urology on Monday.  Also wanted to discuss her blood pressure readings which have been on the high side.  In the 140/80-85 range.  She continues to take hydrochlorothiazide and try to maintain low-sodium diet.  Has been having some mild headache recently no weakness numbness or visual changes no chest pain palpitation shortness of breath or lightheadedness.  The following have been reviewed and updated as appropriate in this visit:  Allergies  Meds  Problems       Review of Systems   Constitutional: Negative for chills, fatigue and fever.   Respiratory: Negative for shortness of breath.    Cardiovascular: Negative for chest pain and palpitations.   Gastrointestinal: Negative for abdominal pain.   Genitourinary: Positive for decreased urine volume, frequency and urgency. Negative for dysuria and hematuria.   Musculoskeletal: Negative for back pain.   Neurological: Positive for headaches.         Assessment/Plan   Diagnoses and all orders for this visit:    Acute cystitis without hematuria (Primary)  -     Urinalysis with Reflex Culture (ED and Outpatient only); Future    Essential hypertension  Assessment & Plan:  Given that she has  urinary frequency and her blood pressures are still not well controlled on the diuretic we will stop this change to lisinopril 10 mg daily.  Continue with low-sodium diet.  She will monitor her blood pressures and send me results over the next several weeks.      Urinary frequency  Assessment & Plan:  Possible overactive bladder.  No evidence for acute infection on recent urinalysis and her culture was negative.  We will check a repeat urinalysis today.  Could consider Myrbetriq but will hold off as she is going to see urology on Monday.  Interstitial cystitis also a possibility.  She does think now she is improving so we will wait on urology's input        Time Spent:  I spent 30 minutes on this date of service performing the following activities: obtaining history, entering orders, documenting, obtaining / reviewing records, independently reviewing study/studies and communicating results.

## 2021-05-19 NOTE — ASSESSMENT & PLAN NOTE
Possible overactive bladder.  No evidence for acute infection on recent urinalysis and her culture was negative.  We will check a repeat urinalysis today.  Could consider Myrbetriq but will hold off as she is going to see urology on Monday.  Interstitial cystitis also a possibility.  She does think now she is improving so we will wait on urology's input

## 2021-05-24 NOTE — PROGRESS NOTES
Cardiology Consult/New Patient    Rosa Melendez MD          Preeti Hyde is a 55 y.o. female identifies as who presents with   She is here for cardiac evaluation  She had episode of chest pain at work she had an animated conversation and had an episode of chest discomfort during a stressful time and has not recurred  She has a family history of premature cardiac disease in his dad who had triple-vessel disease at age 50  She does not know her cholesterol she thinks it has been normal in the past repeat lab work is pending a baseline EKG is normal for ischemic evaluation 2018 that was negative                Patient Active Problem List    Diagnosis Date Noted   • Family history of early CAD 05/27/2021   • Urinary frequency 05/18/2021   • Chest discomfort 04/17/2021   • Seasonal allergies 04/17/2021   • Essential hypertension 04/15/2021       Medical History:   Past Medical History:   Diagnosis Date   • Allergies    • COVID-19    • GERD (gastroesophageal reflux disease)    • Hypertension        Surgical History:   Past Surgical History:   Procedure Laterality Date   • TONSILLECTOMY     • WISDOM TOOTH EXTRACTION         Allergies: Demerol [meperidine] and Pneumococcal vaccine    Current Outpatient Medications   Medication Sig Dispense Refill   • buPROPion XL (WELLBUTRIN XL) 150 mg 24 hr tablet Take 1 tablet (150 mg total) by mouth daily. 90 tablet 3   • hydrochlorothiazide (HYDRODIURIL) 25 mg tablet Take 25 mg by mouth daily.     • levocetirizine (XYZAL) 5 mg tablet Take 5 mg by mouth daily.     • montelukast (SINGULAIR) 10 mg tablet Take 10 mg by mouth daily.       No current facility-administered medications for this visit.       Social History:   Social History     Socioeconomic History   • Marital status:      Spouse name: None   • Number of children: None   • Years of education: None   • Highest education level: None   Occupational History   • None   Tobacco Use   • Smoking status: Never  Smoker   • Smokeless tobacco: Never Used   Vaping Use   • Vaping Use: Never used   Substance and Sexual Activity   • Alcohol use: Yes   • Drug use: Never   • Sexual activity: Yes     Partners: Male   Other Topics Concern   • None   Social History Narrative   • None     Social Determinants of Health     Financial Resource Strain:    • Difficulty of Paying Living Expenses:    Food Insecurity: No Food Insecurity   • Worried About Running Out of Food in the Last Year: Never true   • Ran Out of Food in the Last Year: Never true   Transportation Needs:    • Lack of Transportation (Medical):    • Lack of Transportation (Non-Medical):    Physical Activity:    • Days of Exercise per Week:    • Minutes of Exercise per Session:    Stress:    • Feeling of Stress :    Social Connections:    • Frequency of Communication with Friends and Family:    • Frequency of Social Gatherings with Friends and Family:    • Attends Orthodoxy Services:    • Active Member of Clubs or Organizations:    • Attends Club or Organization Meetings:    • Marital Status:    Intimate Partner Violence:    • Fear of Current or Ex-Partner:    • Emotionally Abused:    • Physically Abused:    • Sexually Abused:        Family History:   Family History   Problem Relation Age of Onset   • Lymphoma Biological Mother    • Heart attack Biological Father    • Aortic aneurysm Biological Father    • Hypertension Biological Father    • Stroke Maternal Grandfather    • Angina Paternal Grandmother    • Heart attack Paternal Grandmother    • Stroke Paternal Grandfather        Review of Systems   ROS    Objective       Vitals:    05/27/21 0907   BP: 126/88   Pulse: 75   SpO2: 97%       Physical Exam  Constitutional:       General: She is not in acute distress.     Appearance: She is well-developed.   HENT:      Head: Normocephalic and atraumatic.      Nose: Nose normal.   Eyes:      General: No scleral icterus.     Conjunctiva/sclera: Conjunctivae normal.   Neck:       Vascular: No JVD.   Cardiovascular:      Rate and Rhythm: Normal rate and regular rhythm.      Pulses: Intact distal pulses.      Heart sounds: Normal heart sounds. No murmur heard.   No friction rub. No gallop.    Pulmonary:      Effort: Pulmonary effort is normal. No respiratory distress.      Breath sounds: No stridor. No wheezing or rales.   Chest:      Chest wall: No tenderness.   Abdominal:      Tenderness: There is no abdominal tenderness.   Musculoskeletal:         General: No deformity.   Skin:     General: Skin is warm and dry.   Neurological:      Mental Status: She is alert and oriented to person, place, and time.          Labs   Lab Results   Component Value Date    WBC 5.94 05/04/2021    HGB 14.7 05/04/2021    HCT 43.6 05/04/2021     05/04/2021    ALT 26 05/04/2021    AST 24 05/04/2021     05/04/2021    K 3.3 (L) 05/04/2021     05/04/2021    CREATININE 0.9 05/04/2021    BUN 20 05/04/2021    CO2 26 05/04/2021       Imaging      STRESS  Stress nuc 2018 neg normal ejection fraction 60%            CAT    COR CTA 8/2021  SUMMARY:  1. 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  2. Cardiac Structure:  Normal.  3. Left Ventricular size and function:  Normal, EF 68%.  4. Coronary calcium score 1.  This places the patient in the ACOSTA 74th risk  percentile for age and gender matched individuals.  5. Overall quality of the scan was good.       ECG MAY 2021        Assessment/Plan     Family history of early CAD  Premature cardiac disease throughout her family history her dad who is significant disease diagnosed at age 50  She has lipid profile pending 1 episode of chest pain associated with a stressful situation at work  Assessed with coronary CT angiogram and rest echo she had negative nuclear stress test in 2018    Essential hypertension  On diuretic           She had one  episode of chest pain  during stressful.  At work  This factors include family history of premature cardiac disease  Evaluation with coronary CT angiogram and rest echocardiogram  Lipid profile pending                            Guicho Blount MD  5/27/2021

## 2021-05-27 ENCOUNTER — TELEPHONE (OUTPATIENT)
Dept: INTERNAL MEDICINE | Facility: CLINIC | Age: 56
End: 2021-05-27

## 2021-05-27 ENCOUNTER — OFFICE VISIT (OUTPATIENT)
Dept: CARDIOLOGY | Facility: CLINIC | Age: 56
End: 2021-05-27
Payer: OTHER GOVERNMENT

## 2021-05-27 VITALS
BODY MASS INDEX: 31.52 KG/M2 | OXYGEN SATURATION: 97 % | HEART RATE: 75 BPM | WEIGHT: 208 LBS | DIASTOLIC BLOOD PRESSURE: 88 MMHG | HEIGHT: 68 IN | SYSTOLIC BLOOD PRESSURE: 126 MMHG

## 2021-05-27 DIAGNOSIS — Z82.49 FAMILY HISTORY OF EARLY CAD: ICD-10-CM

## 2021-05-27 DIAGNOSIS — I10 ESSENTIAL HYPERTENSION: Primary | ICD-10-CM

## 2021-05-27 DIAGNOSIS — R07.89 CHEST DISCOMFORT: ICD-10-CM

## 2021-05-27 PROCEDURE — 3008F BODY MASS INDEX DOCD: CPT | Performed by: INTERNAL MEDICINE

## 2021-05-27 PROCEDURE — 99244 OFF/OP CNSLTJ NEW/EST MOD 40: CPT | Performed by: INTERNAL MEDICINE

## 2021-05-27 PROCEDURE — 93000 ELECTROCARDIOGRAM COMPLETE: CPT | Performed by: INTERNAL MEDICINE

## 2021-05-27 RX ORDER — HYDROCHLOROTHIAZIDE 25 MG/1
25 TABLET ORAL DAILY
COMMUNITY
End: 2021-07-28 | Stop reason: SDUPTHER

## 2021-05-27 ASSESSMENT — PAIN SCALES - GENERAL: PAINLEVEL: 0-NO PAIN

## 2021-05-27 NOTE — ASSESSMENT & PLAN NOTE
Premature cardiac disease throughout her family history her dad who is significant disease diagnosed at age 50  She has lipid profile pending 1 episode of chest pain associated with a stressful situation at work  Assessed with coronary CT angiogram and rest echo she had negative nuclear stress test in 2018

## 2021-05-27 NOTE — TELEPHONE ENCOUNTER
Patient said she would like to go back on hydrochlorothiazide and would like her dosage increased.    She also would like you to know that she went to see her cardiologist today.

## 2021-05-28 NOTE — TELEPHONE ENCOUNTER
She can take HCTZ 25 mg daily - I owuld not go higher than that - let me know if she needs a new rx

## 2021-06-22 LAB
BASOPHILS # BLD AUTO: 0.1 X10E3/UL (ref 0–0.2)
BASOPHILS # BLD AUTO: 0.1 X10E3/UL (ref 0–0.2)
BASOPHILS NFR BLD AUTO: 1 %
BASOPHILS NFR BLD AUTO: 1 %
EOSINOPHIL # BLD AUTO: 0.2 X10E3/UL (ref 0–0.4)
EOSINOPHIL # BLD AUTO: 0.2 X10E3/UL (ref 0–0.4)
EOSINOPHIL NFR BLD AUTO: 4 %
EOSINOPHIL NFR BLD AUTO: 4 %
ERYTHROCYTE [DISTWIDTH] IN BLOOD BY AUTOMATED COUNT: 12.6 % (ref 11.7–15.4)
ERYTHROCYTE [DISTWIDTH] IN BLOOD BY AUTOMATED COUNT: 12.7 % (ref 11.7–15.4)
HCT VFR BLD AUTO: 42.5 % (ref 34–46.6)
HCT VFR BLD AUTO: 45.4 % (ref 34–46.6)
HGB BLD-MCNC: 14.6 G/DL (ref 11.1–15.9)
HGB BLD-MCNC: 15.2 G/DL (ref 11.1–15.9)
IMM GRANULOCYTES # BLD AUTO: 0 X10E3/UL (ref 0–0.1)
IMM GRANULOCYTES # BLD AUTO: 0 X10E3/UL (ref 0–0.1)
IMM GRANULOCYTES NFR BLD AUTO: 0 %
IMM GRANULOCYTES NFR BLD AUTO: 0 %
LYMPHOCYTES # BLD AUTO: 1.2 X10E3/UL (ref 0.7–3.1)
LYMPHOCYTES # BLD AUTO: 1.3 X10E3/UL (ref 0.7–3.1)
LYMPHOCYTES NFR BLD AUTO: 25 %
LYMPHOCYTES NFR BLD AUTO: 25 %
MCH RBC QN AUTO: 31 PG (ref 26.6–33)
MCH RBC QN AUTO: 31.9 PG (ref 26.6–33)
MCHC RBC AUTO-ENTMCNC: 33.5 G/DL (ref 31.5–35.7)
MCHC RBC AUTO-ENTMCNC: 34.4 G/DL (ref 31.5–35.7)
MCV RBC AUTO: 93 FL (ref 79–97)
MCV RBC AUTO: 93 FL (ref 79–97)
MONOCYTES # BLD AUTO: 0.4 X10E3/UL (ref 0.1–0.9)
MONOCYTES # BLD AUTO: 0.4 X10E3/UL (ref 0.1–0.9)
MONOCYTES NFR BLD AUTO: 7 %
MONOCYTES NFR BLD AUTO: 8 %
NEUTROPHILS # BLD AUTO: 2.9 X10E3/UL (ref 1.4–7)
NEUTROPHILS # BLD AUTO: 3.1 X10E3/UL (ref 1.4–7)
NEUTROPHILS NFR BLD AUTO: 62 %
NEUTROPHILS NFR BLD AUTO: 63 %
PLATELET # BLD AUTO: 278 X10E3/UL (ref 150–450)
PLATELET # BLD AUTO: 297 X10E3/UL (ref 150–450)
RBC # BLD AUTO: 4.58 X10E6/UL (ref 3.77–5.28)
RBC # BLD AUTO: 4.9 X10E6/UL (ref 3.77–5.28)
WBC # BLD AUTO: 4.7 X10E3/UL (ref 3.4–10.8)
WBC # BLD AUTO: 5 X10E3/UL (ref 3.4–10.8)

## 2021-06-23 LAB
ALBUMIN SERPL-MCNC: 4.3 G/DL (ref 3.8–4.9)
ALBUMIN SERPL-MCNC: 4.5 G/DL (ref 3.8–4.9)
ALBUMIN/GLOB SERPL: 2.5 {RATIO} (ref 1.2–2.2)
ALBUMIN/GLOB SERPL: 2.6 {RATIO} (ref 1.2–2.2)
ALP SERPL-CCNC: 70 IU/L (ref 48–121)
ALP SERPL-CCNC: 72 IU/L (ref 48–121)
ALT SERPL-CCNC: 16 IU/L (ref 0–32)
ALT SERPL-CCNC: 20 IU/L (ref 0–32)
AST SERPL-CCNC: 18 IU/L (ref 0–40)
AST SERPL-CCNC: 19 IU/L (ref 0–40)
BILIRUB SERPL-MCNC: 0.4 MG/DL (ref 0–1.2)
BILIRUB SERPL-MCNC: 0.5 MG/DL (ref 0–1.2)
BUN SERPL-MCNC: 13 MG/DL (ref 6–24)
BUN SERPL-MCNC: 13 MG/DL (ref 6–24)
BUN/CREAT SERPL: 14 (ref 9–23)
BUN/CREAT SERPL: 15 (ref 9–23)
CALCIUM SERPL-MCNC: 9.4 MG/DL (ref 8.7–10.2)
CALCIUM SERPL-MCNC: 9.5 MG/DL (ref 8.7–10.2)
CHLORIDE SERPL-SCNC: 102 MMOL/L (ref 96–106)
CHLORIDE SERPL-SCNC: 103 MMOL/L (ref 96–106)
CHOLEST SERPL-MCNC: 192 MG/DL (ref 100–199)
CHOLEST SERPL-MCNC: 193 MG/DL (ref 100–199)
CO2 SERPL-SCNC: 26 MMOL/L (ref 20–29)
CO2 SERPL-SCNC: 26 MMOL/L (ref 20–29)
CREAT SERPL-MCNC: 0.89 MG/DL (ref 0.57–1)
CREAT SERPL-MCNC: 0.91 MG/DL (ref 0.57–1)
GLOBULIN SER CALC-MCNC: 1.7 G/DL (ref 1.5–4.5)
GLOBULIN SER CALC-MCNC: 1.7 G/DL (ref 1.5–4.5)
GLUCOSE SERPL-MCNC: 86 MG/DL (ref 65–99)
GLUCOSE SERPL-MCNC: 89 MG/DL (ref 65–99)
HDLC SERPL-MCNC: 48 MG/DL
HDLC SERPL-MCNC: 49 MG/DL
LAB CORP EGFR IF AFRICN AM: 82 ML/MIN/1.73
LAB CORP EGFR IF AFRICN AM: 84 ML/MIN/1.73
LAB CORP EGFR IF NONAFRICN AM: 71 ML/MIN/1.73
LAB CORP EGFR IF NONAFRICN AM: 73 ML/MIN/1.73
LDLC SERPL CALC-MCNC: 129 MG/DL (ref 0–99)
LDLC SERPL CALC-MCNC: 131 MG/DL (ref 0–99)
POTASSIUM SERPL-SCNC: 3.9 MMOL/L (ref 3.5–5.2)
POTASSIUM SERPL-SCNC: 4.1 MMOL/L (ref 3.5–5.2)
PROT SERPL-MCNC: 6 G/DL (ref 6–8.5)
PROT SERPL-MCNC: 6.2 G/DL (ref 6–8.5)
SODIUM SERPL-SCNC: 141 MMOL/L (ref 134–144)
SODIUM SERPL-SCNC: 142 MMOL/L (ref 134–144)
TRIGL SERPL-MCNC: 78 MG/DL (ref 0–149)
TRIGL SERPL-MCNC: 79 MG/DL (ref 0–149)
TSH SERPL DL<=0.005 MIU/L-ACNC: 2.33 UIU/ML (ref 0.45–4.5)
VLDLC SERPL CALC-MCNC: 14 MG/DL (ref 5–40)
VLDLC SERPL CALC-MCNC: 14 MG/DL (ref 5–40)

## 2021-06-24 ENCOUNTER — TELEPHONE (OUTPATIENT)
Dept: SCHEDULING | Facility: CLINIC | Age: 56
End: 2021-06-24

## 2021-06-24 NOTE — TELEPHONE ENCOUNTER
Received notice from registration that authorization is needed for pt's upcoming CTA scheduled on 6/30/21.    Clinicals faxed,pending review per Kinga, she also informed it can take 24hr to 3 days for a decision.     Ref# [id number]

## 2021-06-26 ENCOUNTER — TELEPHONE (OUTPATIENT)
Dept: INTERNAL MEDICINE | Facility: CLINIC | Age: 56
End: 2021-06-26

## 2021-06-27 NOTE — TELEPHONE ENCOUNTER
Please let Ms Hyde know that her blood work is overall good except for LDL of 131.  I would like her to follow a heart healthy Mediterranean diet.  Decreased saturated fat cholesterol and fried foods avoid refined sugars and carbohydrates.  We will recheck again next year

## 2021-06-28 ENCOUNTER — TELEPHONE (OUTPATIENT)
Dept: INTERNAL MEDICINE | Facility: CLINIC | Age: 56
End: 2021-06-28

## 2021-06-28 NOTE — TELEPHONE ENCOUNTER
Left a detailed vm informing pt of results being overall good but the LDL being 131 and Dr. Lechuga suggests heart healthy diet msg in it's entirety. Thank you.

## 2021-06-28 NOTE — TELEPHONE ENCOUNTER
Pt states medication was making her constipated and she is no longer taking  Pt states she does not need a replacement medication at this time  buPROPion XL (WELLBUTRIN XL) 150 mg

## 2021-06-29 NOTE — TELEPHONE ENCOUNTER
Called auth number, was told that clinicals were not attached to the auth request in their system(although clinicals were faxed by pre cert team), so clinicals faxed again today. Was told it would take about 3 business days to get auth

## 2021-06-29 NOTE — TELEPHONE ENCOUNTER
Meera,    Can you please check the status of the pre-cert on this patient.    Thank you!    Michelle

## 2021-06-30 NOTE — TELEPHONE ENCOUNTER
Re-faxed the documents with Telephone number and home address. Received the fax stating that insurance required 3 pieces of information to process the request.

## 2021-07-27 ENCOUNTER — TELEPHONE (OUTPATIENT)
Dept: CARDIOLOGY | Facility: CLINIC | Age: 56
End: 2021-07-27

## 2021-07-27 NOTE — TELEPHONE ENCOUNTER
Called and LMOM on pt VM stating that we called insurance comp and no pre cert's are req for CT scan, it is based on medical necessity and a claim has to be processed

## 2021-07-28 NOTE — TELEPHONE ENCOUNTER
Medicine Refill Request    Last Office Visit: 4/15/2021  Last Telemedicine Visit: 5/18/2021 Rosa Melendez MD    Next Office Visit: Visit date not found  Next Telemedicine Visit: Visit date not found         Current Outpatient Medications:   •  hydrochlorothiazide (HYDRODIURIL) 25 mg tablet, Take 25 mg by mouth daily., Disp: , Rfl:   •  levocetirizine (XYZAL) 5 mg tablet, Take 5 mg by mouth daily., Disp: , Rfl:   •  montelukast (SINGULAIR) 10 mg tablet, Take 10 mg by mouth daily., Disp: , Rfl:   •  valACYclovir (VALTREX) 500 mg tablet, Take 500 mg by mouth daily., Disp: , Rfl:       BP Readings from Last 3 Encounters:   05/27/21 126/88   05/10/21 (!) 148/76   05/04/21 (!) 158/62       Recent Lab results:  Lab Results   Component Value Date    CHOL 193 06/22/2021   ,   Lab Results   Component Value Date    HDL 48 06/22/2021   ,   Lab Results   Component Value Date    LDLCALC 131 (H) 06/22/2021   ,   Lab Results   Component Value Date    TRIG 78 06/22/2021        Lab Results   Component Value Date    GLUCOSE 86 06/22/2021   , No results found for: HGBA1C      Lab Results   Component Value Date    CREATININE 0.91 06/22/2021       Lab Results   Component Value Date    TSH 2.330 06/22/2021

## 2021-07-29 RX ORDER — HYDROCHLOROTHIAZIDE 25 MG/1
25 TABLET ORAL DAILY
Qty: 90 TABLET | Refills: 1 | Status: SHIPPED | OUTPATIENT
Start: 2021-07-29

## 2021-08-06 DIAGNOSIS — I10 ESSENTIAL HYPERTENSION: Primary | ICD-10-CM

## 2021-08-06 DIAGNOSIS — Z82.49 FAMILY HISTORY OF EARLY CAD: ICD-10-CM

## 2021-08-10 LAB
BUN SERPL-MCNC: 22 MG/DL (ref 6–24)
BUN/CREAT SERPL: 21 (ref 9–23)
CREAT SERPL-MCNC: 1.05 MG/DL (ref 0.57–1)
LAB CORP EGFR IF AFRICN AM: 69 ML/MIN/1.73
LAB CORP EGFR IF NONAFRICN AM: 60 ML/MIN/1.73

## 2021-08-11 ENCOUNTER — HOSPITAL ENCOUNTER (OUTPATIENT)
Dept: RADIOLOGY | Facility: HOSPITAL | Age: 56
Discharge: HOME | End: 2021-08-11
Attending: INTERNAL MEDICINE
Payer: OTHER GOVERNMENT

## 2021-08-11 ENCOUNTER — TELEPHONE (OUTPATIENT)
Dept: SCHEDULING | Facility: CLINIC | Age: 56
End: 2021-08-11

## 2021-08-11 VITALS
SYSTOLIC BLOOD PRESSURE: 114 MMHG | BODY MASS INDEX: 31.83 KG/M2 | HEIGHT: 68 IN | HEART RATE: 59 BPM | DIASTOLIC BLOOD PRESSURE: 73 MMHG | WEIGHT: 210 LBS | OXYGEN SATURATION: 97 %

## 2021-08-11 DIAGNOSIS — Z82.49 FAMILY HISTORY OF EARLY CAD: ICD-10-CM

## 2021-08-11 DIAGNOSIS — R07.89 CHEST DISCOMFORT: ICD-10-CM

## 2021-08-11 PROCEDURE — 0503T HC COR FFR ALYS GNRJ FFR MDL: CPT

## 2021-08-11 PROCEDURE — 4A033BC MEASUREMENT OF ARTERIAL PRESSURE, CORONARY, PERCUTANEOUS APPROACH: ICD-10-PCS | Performed by: INTERNAL MEDICINE

## 2021-08-11 PROCEDURE — 63700000 HC SELF-ADMINISTRABLE DRUG: Performed by: INTERNAL MEDICINE

## 2021-08-11 PROCEDURE — 63600105 HC IODINE BASED CONTRAST: Mod: JW | Performed by: INTERNAL MEDICINE

## 2021-08-11 PROCEDURE — 75574 CT ANGIO HRT W/3D IMAGE: CPT | Mod: ME

## 2021-08-11 RX ORDER — METOPROLOL TARTRATE 1 MG/ML
5 INJECTION, SOLUTION INTRAVENOUS AS NEEDED
Status: DISCONTINUED | OUTPATIENT
Start: 2021-08-11 | End: 2021-08-12 | Stop reason: HOSPADM

## 2021-08-11 RX ORDER — NITROGLYCERIN 0.4 MG/1
0.4 TABLET SUBLINGUAL ONCE
Status: COMPLETED | OUTPATIENT
Start: 2021-08-11 | End: 2021-08-11

## 2021-08-11 RX ORDER — METOPROLOL TARTRATE 50 MG/1
50 TABLET ORAL EVERY 30 MIN PRN
Status: DISCONTINUED | OUTPATIENT
Start: 2021-08-11 | End: 2021-08-12 | Stop reason: HOSPADM

## 2021-08-11 RX ORDER — IVABRADINE 7.5 MG/1
15 TABLET, FILM COATED ORAL ONCE
Status: COMPLETED | OUTPATIENT
Start: 2021-08-11 | End: 2021-08-11

## 2021-08-11 RX ORDER — METOPROLOL TARTRATE 50 MG/1
100 TABLET ORAL ONCE
Status: COMPLETED | OUTPATIENT
Start: 2021-08-11 | End: 2021-08-11

## 2021-08-11 RX ADMIN — IOHEXOL 95 ML: 350 INJECTION, SOLUTION INTRAVENOUS at 09:52

## 2021-08-11 RX ADMIN — METOPROLOL TARTRATE 100 MG: 50 TABLET, FILM COATED ORAL at 08:51

## 2021-08-11 RX ADMIN — IVABRADINE 15 MG: 7.5 TABLET, FILM COATED ORAL at 08:50

## 2021-08-11 RX ADMIN — NITROGLYCERIN 0.4 MG: 0.4 TABLET SUBLINGUAL at 09:37

## 2021-08-11 NOTE — NURSING NOTE
Cardiac CTA Worksheet    Chest pain (symptomatic patients):  Equivocal stress test (exercise, perfusion, or stress echo)    Episode of chest pain at work during high stress      siituation.    Reason for Exam:  CAD suspected                          Chest discomfort                        Strong family history early CAD                                (paternal)    NPO since 0630 today  No caffeine    IV started #18G RAC                        Has episode of bronchitis which is being treated.  Had steroid course ended a few days ago, currently taking antibiotics and using inhaler and cough medicines.  Using inhaler to see if it will help ease her cough at present.      Ivabradine (CORLANOR) 15mg, metoprolol tartrate (LOPRESSOR) 100mg and nitroglycerin (NITROSTAT) .4mg  Meds PO and chewed and NTG SL      CTA completed.  IV d/c'd.  Discharge instructions reviewed.  Had water and crackers.

## 2021-08-12 ENCOUNTER — TELEPHONE (OUTPATIENT)
Dept: CARDIOLOGY | Facility: CLINIC | Age: 56
End: 2021-08-12

## 2021-08-12 PROBLEM — I25.10 CORONARY ARTERY DISEASE INVOLVING NATIVE CORONARY ARTERY OF NATIVE HEART WITHOUT ANGINA PECTORIS: Status: ACTIVE | Noted: 2021-08-12

## 2021-08-24 NOTE — PROGRESS NOTES
Cardiology Consult/New Patient    Rosa Melendez MD          Preeti Hyde is a 55 y.o. female identifies as who presents with       She returns after coronary CT angiogram and August 2021 minimal plaque with corresponding calcium score of 1  Echocardiogram today npormal  She had had some atypical chest pain during a stressful situation at work  Family history of premature cardiac disease dad's mom  Age 50  She is treated for hypertension  She has mildly elevated cholesterol 131 LDL                              Patient Active Problem List    Diagnosis Date Noted   • Coronary artery disease involving native coronary artery of native heart without angina pectoris 08/12/2021   • Family history of early CAD 05/27/2021   • Urinary frequency 05/18/2021   • Chest discomfort 04/17/2021   • Seasonal allergies 04/17/2021   • Essential hypertension 04/15/2021       Medical History:   Past Medical History:   Diagnosis Date   • Allergies    • COVID-19    • GERD (gastroesophageal reflux disease)    • Hypertension        Surgical History:   Past Surgical History:   Procedure Laterality Date   • TONSILLECTOMY     • WISDOM TOOTH EXTRACTION         Allergies: Demerol [meperidine] and Pneumococcal vaccine    Current Outpatient Medications   Medication Sig Dispense Refill   • albuterol HFA (VENTOLIN HFA) 90 mcg/actuation inhaler      • brompheniramine-pseudoeph-DM 2-30-10 mg/5 mL syrup      • hydrochlorothiazide (HYDRODIURIL) 25 mg tablet Take 1 tablet (25 mg total) by mouth daily. 90 tablet 1   • levocetirizine (XYZAL) 5 mg tablet Take 5 mg by mouth daily.     • montelukast (SINGULAIR) 10 mg tablet Take 10 mg by mouth daily.     • valACYclovir (VALTREX) 500 mg tablet Take 500 mg by mouth daily.     • doxycycline hyclate (VIBRA-TABS) 100 mg tablet      • predniSONE (DELTASONE) 20 mg tablet 40 mg daily.     • rosuvastatin (CRESTOR) 20 mg tablet Take 1 tablet (20 mg total) by mouth daily. 90 tablet 1     No current  facility-administered medications for this visit.       Social History:   Social History     Socioeconomic History   • Marital status:      Spouse name: None   • Number of children: None   • Years of education: None   • Highest education level: None   Occupational History   • None   Tobacco Use   • Smoking status: Never Smoker   • Smokeless tobacco: Never Used   Vaping Use   • Vaping Use: Never used   Substance and Sexual Activity   • Alcohol use: Yes   • Drug use: Never   • Sexual activity: Yes     Partners: Male   Other Topics Concern   • None   Social History Narrative   • None     Social Determinants of Health     Financial Resource Strain:    • Difficulty of Paying Living Expenses:    Food Insecurity: No Food Insecurity   • Worried About Running Out of Food in the Last Year: Never true   • Ran Out of Food in the Last Year: Never true   Transportation Needs:    • Lack of Transportation (Medical):    • Lack of Transportation (Non-Medical):    Physical Activity:    • Days of Exercise per Week:    • Minutes of Exercise per Session:    Stress:    • Feeling of Stress :    Social Connections:    • Frequency of Communication with Friends and Family:    • Frequency of Social Gatherings with Friends and Family:    • Attends [employer] Services:    • Active Member of Clubs or Organizations:    • Attends Club or Organization Meetings:    • Marital Status:    Intimate Partner Violence:    • Fear of Current or Ex-Partner:    • Emotionally Abused:    • Physically Abused:    • Sexually Abused:        Family History:   Family History   Problem Relation Age of Onset   • Lymphoma Biological Mother    • Heart attack Biological Father    • Aortic aneurysm Biological Father    • Hypertension Biological Father    • Stroke Maternal Grandfather    • Angina Paternal Grandmother    • Heart attack Paternal Grandmother    • Stroke Paternal Grandfather        Review of Systems   ROS    Objective       Vitals:    08/30/21 0857   BP:  140/90   Pulse: 71   SpO2: 99%       Physical Exam  Constitutional:       General: She is not in acute distress.     Appearance: She is well-developed.   HENT:      Head: Normocephalic and atraumatic.      Nose: Nose normal.   Eyes:      General: No scleral icterus.     Conjunctiva/sclera: Conjunctivae normal.   Neck:      Vascular: No JVD.   Cardiovascular:      Rate and Rhythm: Normal rate and regular rhythm.      Pulses: Intact distal pulses.      Heart sounds: Normal heart sounds. No murmur heard.   No friction rub. No gallop.    Pulmonary:      Effort: Pulmonary effort is normal. No respiratory distress.      Breath sounds: No stridor. No wheezing or rales.   Chest:      Chest wall: No tenderness.   Abdominal:      Tenderness: There is no abdominal tenderness.   Musculoskeletal:         General: No deformity.   Skin:     General: Skin is warm and dry.   Neurological:      Mental Status: She is alert and oriented to person, place, and time.          Labs   Lab Results   Component Value Date    WBC 5.0 06/22/2021    HGB 15.2 06/22/2021    HCT 45.4 06/22/2021     06/22/2021    CHOL 193 06/22/2021    TRIG 78 06/22/2021    HDL 48 06/22/2021    ALT 20 06/22/2021    AST 19 06/22/2021     06/22/2021    K 3.9 06/22/2021     06/22/2021    CREATININE 1.05 (H) 08/09/2021    BUN 22 08/09/2021    CO2 26 06/22/2021    TSH 2.330 06/22/2021   [id number]    Imaging    ECHO  Echo normal 8/2021        STRESS  Stress nuc 2018 neg normal ejection fraction 60%            CAT    COR CTA 8/2021  SUMMARY:  1. 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  2. Cardiac Structure:  Normal.  3. Left Ventricular size and function:  Normal, EF 68%.  4. Coronary calcium score 1.  This places the patient in the ACOSTA 74th risk  percentile for age and gender matched individuals.  5. Overall quality of the scan was good.       ECG MAY  2021        Assessment/Plan     Coronary artery disease involving native coronary artery of native heart without angina pectoris  She had some atypical chest pain coronary CT angiogram in August she had soft plaque in her LAD coronary calcium score of only 1 strong family history of premature coronary disease on her father side of the family whenever risks and benefits of statin therapy she agrees we will begin rosuvastatin 20 for baseline LDL cholesterol of 130    Family history of early CAD  Dad had a heart attack at 50 and stated multiple family members on his side of the family               She had recent coronary CT angiogram with minimal calcification of 1 some soft plaque with her strong family history of premature cardiac disease on her father side and mild hyperlipidemia we went over risks and benefits of statin therapy and she agrees to begin rosuvastatin 20  Follow-up in 3 months with lab work                        Guicho Blount MD  8/30/2021

## 2021-08-30 ENCOUNTER — HOSPITAL ENCOUNTER (OUTPATIENT)
Dept: CARDIOLOGY | Facility: CLINIC | Age: 56
Discharge: HOME | End: 2021-08-30
Payer: OTHER GOVERNMENT

## 2021-08-30 ENCOUNTER — OFFICE VISIT (OUTPATIENT)
Dept: CARDIOLOGY | Facility: CLINIC | Age: 56
End: 2021-08-30
Payer: OTHER GOVERNMENT

## 2021-08-30 ENCOUNTER — TELEPHONE (OUTPATIENT)
Dept: INTERNAL MEDICINE | Facility: CLINIC | Age: 56
End: 2021-08-30

## 2021-08-30 VITALS
WEIGHT: 210 LBS | SYSTOLIC BLOOD PRESSURE: 115 MMHG | HEIGHT: 68 IN | DIASTOLIC BLOOD PRESSURE: 80 MMHG | BODY MASS INDEX: 31.83 KG/M2

## 2021-08-30 VITALS
OXYGEN SATURATION: 99 % | DIASTOLIC BLOOD PRESSURE: 90 MMHG | HEART RATE: 71 BPM | WEIGHT: 214 LBS | BODY MASS INDEX: 32.54 KG/M2 | SYSTOLIC BLOOD PRESSURE: 140 MMHG

## 2021-08-30 DIAGNOSIS — I10 ESSENTIAL HYPERTENSION: ICD-10-CM

## 2021-08-30 DIAGNOSIS — I25.10 CORONARY ARTERY DISEASE INVOLVING NATIVE CORONARY ARTERY OF NATIVE HEART WITHOUT ANGINA PECTORIS: Primary | ICD-10-CM

## 2021-08-30 DIAGNOSIS — R07.89 CHEST DISCOMFORT: ICD-10-CM

## 2021-08-30 LAB
AORTIC ROOT ANNULUS: 3.4 CM
ASCENDING AORTA: 3.7 CM
AV PEAK GRADIENT: 7 MMHG
AV PEAK VELOCITY-S: 1.28 M/S
AV VALVE AREA: 2.87 CM2
BSA FOR ECHO PROCEDURE: 2.14 M2
E WAVE DECELERATION TIME: 306 MS
E/A RATIO: 0.7
E/E' RATIO: 9
E/LAT E' RATIO: 5.7
EDV (BP): 40.6 CM3
EF (A4C): 68.3 %
EF A2C: 64.8 %
EJECTION FRACTION: 67 %
EST RIGHT VENT SYSTOLIC PRESSURE BY TRICUSPID REGURGITATION JET: 21 MMHG
ESV (BP): 13.4 CM3
FRACTIONAL SHORTENING: 35.8 %
INTERVENTRICULAR SEPTUM: 1.09 CM
LA ESV (BP): 39.1 CM3
LA ESV INDEX (A2C): 18.08 CM3/M2
LA ESV INDEX (BP): 18.27 CM3/M2
LA/AORTA RATIO: 1.06
LAAS-AP2: 15.7 CM2
LAAS-AP4: 15.3 CM2
LAD 2D: 3.6 CM
LALD A4C: 4.69 CM
LALD A4C: 4.85 CM
LAV-S: 38.7 CM3
LEFT ATRIUM VOLUME INDEX: 17.99 CM3/M2
LEFT ATRIUM VOLUME: 38.5 CM3
LEFT INTERNAL DIMENSION IN SYSTOLE: 2.46 CM (ref 3.2–4.85)
LEFT VENTRICLE DIASTOLIC VOLUME INDEX: 17.66 CM3/M2
LEFT VENTRICLE DIASTOLIC VOLUME: 37.8 CM3
LEFT VENTRICLE SYSTOLIC VOLUME INDEX: 5.61 CM3/M2
LEFT VENTRICLE SYSTOLIC VOLUME: 12 CM3
LEFT VENTRICULAR INTERNAL DIMENSION IN DIASTOLE: 3.83 CM (ref 5.47–7.6)
LEFT VENTRICULAR POSTERIOR WALL IN END DIASTOLE: 1.08 CM (ref 0.7–1.3)
LV DIASTOLIC VOLUME: 39.5 CM3
LV ESV (APICAL 2 CHAMBER): 13.9 CM3
LVAD-AP2: 17 CM2
LVAD-AP4: 17.9 CM2
LVAS-AP2: 8.86 CM2
LVAS-AP4: 8.91 CM2
LVEDVI(A2C): 18.46 CM3/M2
LVEDVI(BP): 18.97 CM3/M2
LVESVI(A2C): 6.5 CM3/M2
LVESVI(BP): 6.26 CM3/M2
LVLD-AP2: 6.41 CM
LVLD-AP4: 7.11 CM
LVLS-AP2: 5.01 CM
LVLS-AP4: 5.5 CM
LVOT 2D: 2 CM
LVOT A: 3.14 CM2
LVOT PEAK VELOCITY: 1.17 M/S
MV E'TISSUE VEL-LAT: 0.12 M/S
MV E'TISSUE VEL-MED: 0.08 M/S
MV PEAK A VEL: 0.96 M/S
MV PEAK E VEL: 0.68 M/S
POSTERIOR WALL: 1.08 CM
PULMONARY REGURGITATION LATE DIASTOLIC VELOCITY: 0.81 M/S
PV PEAK GRADIENT: 4 MMHG
PV PV: 1.03 M/S
RAP: 5 MMHG
RVOT VMAX: 0.85 M/S
RVOT VTI: 17 CM
SEPTAL TISSUE DOPPLER FREE WALL LATE DIA VELOCITY (APICAL 4 CHAMBER VIEW): 0.11 M/S
TR MAX PG: 16 MMHG
TRICUSPID VALVE PEAK REGURGITATION VELOCITY: 2.02 M/S
Z-SCORE OF LEFT VENTRICULAR DIMENSION IN END DIASTOLE: -5.21
Z-SCORE OF LEFT VENTRICULAR DIMENSION IN END SYSTOLE: -3.73
Z-SCORE OF LEFT VENTRICULAR POSTERIOR WALL IN END DIASTOLE: 0.68

## 2021-08-30 PROCEDURE — 93000 ELECTROCARDIOGRAM COMPLETE: CPT | Performed by: INTERNAL MEDICINE

## 2021-08-30 PROCEDURE — 93306 TTE W/DOPPLER COMPLETE: CPT | Performed by: INTERNAL MEDICINE

## 2021-08-30 PROCEDURE — 99213 OFFICE O/P EST LOW 20 MIN: CPT | Performed by: INTERNAL MEDICINE

## 2021-08-30 PROCEDURE — 3008F BODY MASS INDEX DOCD: CPT | Performed by: INTERNAL MEDICINE

## 2021-08-30 RX ORDER — ROSUVASTATIN CALCIUM 20 MG/1
20 TABLET, COATED ORAL DAILY
Qty: 90 TABLET | Refills: 1 | Status: SHIPPED | OUTPATIENT
Start: 2021-08-30 | End: 2021-08-30 | Stop reason: SDUPTHER

## 2021-08-30 RX ORDER — ROSUVASTATIN CALCIUM 20 MG/1
20 TABLET, COATED ORAL DAILY
Qty: 90 TABLET | Refills: 1 | Status: SHIPPED | OUTPATIENT
Start: 2021-08-30 | End: 2022-07-15

## 2021-08-30 NOTE — ASSESSMENT & PLAN NOTE
She had some atypical chest pain coronary CT angiogram in August she had soft plaque in her LAD coronary calcium score of only 1 strong family history of premature coronary disease on her father side of the family whenever risks and benefits of statin therapy she agrees we will begin rosuvastatin 20 for baseline LDL cholesterol of 130

## 2021-08-30 NOTE — TELEPHONE ENCOUNTER
"Patient has cough...\"gets bronchitis all the time\"....went to  and had an antibiotic, prednisone and inhaler, cough syrup, nothing helping ...just not getting any better....needs to be seen and have form completed for work.....had a negative Covid test about a week ago..did have a positive antibody test....looking for an appt Thursday..please call and leave a message as she can not answer the phone at work  "

## 2021-08-31 NOTE — TELEPHONE ENCOUNTER
Left detailed message for pt to call us back ; need to know what urgent care she was seen at and get report faxed to us  Also where did she have the covid testing done ;  was it a regular test or rapid and obtain results

## 2021-09-01 NOTE — TELEPHONE ENCOUNTER
Patient had a rapid COVID test in [place], [place].  It was negative.  She will call back tomorrow with there name and number. She was tested again at Health system in [place], [place]. ([phone]) I called for her records.  She was tested positive for COVID antibodies in December.  I scheduled a telemedicine appointment for her with Dr. Lechuga to discuss [facility] paperwork.

## 2021-09-07 ENCOUNTER — TELEPHONE (OUTPATIENT)
Dept: INTERNAL MEDICINE | Facility: CLINIC | Age: 56
End: 2021-09-07

## 2021-09-07 NOTE — TELEPHONE ENCOUNTER
Patient called to let us know she was seen at [facility] in [place], [place] for a cough and wanted our office to request records. I called the office's number at [phone] and left a message on their voicemail requesting records for that visit.

## 2021-09-08 ENCOUNTER — TELEMEDICINE (OUTPATIENT)
Dept: INTERNAL MEDICINE | Facility: CLINIC | Age: 56
End: 2021-09-08
Payer: OTHER GOVERNMENT

## 2021-09-08 DIAGNOSIS — J40 TRACHEOBRONCHITIS: ICD-10-CM

## 2021-09-08 DIAGNOSIS — J32.9 RECURRENT SINUSITIS: Primary | ICD-10-CM

## 2021-09-08 PROCEDURE — 99214 OFFICE O/P EST MOD 30 MIN: CPT | Mod: GT | Performed by: INTERNAL MEDICINE

## 2021-09-08 RX ORDER — METHYLPREDNISOLONE 4 MG/1
TABLET ORAL
Qty: 21 TABLET | Refills: 0 | Status: SHIPPED | OUTPATIENT
Start: 2021-09-08 | End: 2021-09-15

## 2021-09-08 RX ORDER — ALBUTEROL SULFATE 90 UG/1
2 INHALANT RESPIRATORY (INHALATION) EVERY 6 HOURS PRN
Qty: 18 G | Refills: 1 | Status: SHIPPED | OUTPATIENT
Start: 2021-09-08 | End: 2022-05-12

## 2021-09-08 RX ORDER — CEFUROXIME AXETIL 500 MG/1
500 TABLET ORAL 2 TIMES DAILY
Qty: 20 TABLET | Refills: 0 | Status: SHIPPED | OUTPATIENT
Start: 2021-09-08 | End: 2021-09-18

## 2021-09-08 RX ORDER — BROMPHENIRAMINE MALEATE, PSEUDOEPHEDRINE HYDROCHLORIDE, AND DEXTROMETHORPHAN HYDROBROMIDE 2; 30; 10 MG/5ML; MG/5ML; MG/5ML
5 SYRUP ORAL 4 TIMES DAILY PRN
Qty: 473 ML | Refills: 0 | Status: SHIPPED | OUTPATIENT
Start: 2021-09-08 | End: 2021-09-18

## 2021-09-08 NOTE — PROGRESS NOTES
Verification of Patient Location:  The patient affirms they are currently located in the following state: [place]    Request for Consent:    Audio and Video Encounter   Hello, my name is Rosa YANDY Smith MD.  Before we proceed, can you please verify your identification by telling me your full name and date of birth?  Can you tell me who is in the room with you?    You and I are about to have a telemedicine check-in or visit because you have requested it.  This is a live video-conference.  I am a real person, speaking to you in real time.  There is no one else with me on the video-conference.  However, when we use ([employer], [employer], etc) it is important for you to know that the video-conference may not be secure or private.  I am not recording this conversation and I am asking you not to record it.  This telemedicine visit will be billed to your health insurance or you, if you are self-insured.  You understand you will be responsible for any copayments or coinsurances that apply to your telemedicine visit.  Communication platform used for this encounter:  [employer]     Before starting our telemedicine visit, I am required to get your consent for this virtual check-in or visit by telemedicine. Do you consent?      Patient Response to Request for Consent:  Yes      Visit Documentation:  Subjective     Patient ID: Preeti Hyde is a 55 y.o. female.  1965      HPI  This is a telemed video visit  [employer]ia [employer] - she has had recurrent bronchitis twice-was evaluated at an urgent care in [place] on August 5 given doxycycline 100 mg twice daily for 10 days and prednisone 20 mg daily for 5 days.  At that time had sinus congestion postnasal drip and cough with yellow sputum.  No fever or chills.  She improved somewhat but then her symptoms recurred in early September was seen at urgent care in this area and given a Medrol Dosepak and Augmentin along with albuterol inhaler and a cough syrup.  She has  improved somewhat with this but still has residual cough and sinus congestion.  Has a history of having had Covid in December did have both of her Covid vaccines.  She was tested for Covid with her recent episode and is negative.  Of note she relates a history of IgG deficiency.  Has been being followed by an allergist immunologist in the [place] area where she had been living.  She has never received immunoglobulin infusions.  Also has followed with ENT but would like to establish with ENT in this area.  Due to her frequent upper respiratory infections and needing time off for that her employer has asked her to apply for intermittent FMLA.  She will send those papers to me.  The following have been reviewed and updated as appropriate in this visit:  Allergies  Meds  Problems       Review of Systems   Constitutional: Negative for unexpected weight change.   HENT: Positive for postnasal drip, rhinorrhea and sinus pressure. Negative for ear discharge, ear pain, sinus pain, sneezing and sore throat.    Respiratory: Positive for cough. Negative for chest tightness and shortness of breath.    Cardiovascular: Negative for chest pain and palpitations.   Neurological: Negative for dizziness and headaches.         Assessment/Plan   Diagnoses and all orders for this visit:    Recurrent sinusitis (Primary)  Assessment & Plan:  She has recurrent sinus symptoms partially treated with the Augmentin.  Feels she does better with Ceftin.  Will prescribe 1 more week of Ceftin along with another Medrol Dosepak.  I referred her to Dr. Tiera Olivera for further ENT evaluation      Tracheobronchitis  Assessment & Plan:  She gets repeated upper respiratory infections.  I am concerned given her history of IgG deficiency.  Would like her to see allergy and immunology.  May need to consider immunoglobulin infusion.  Referred to Dr. Mccarthy at [facility]      Other orders  -     cefuroxime (CEFTIN) 500 mg tablet; Take 1 tablet (500  mg total) by mouth 2 (two) times a day for 10 days.  -     methylPREDNISolone (MEDROL DOSEPACK) 4 mg tablet; Follow package directions.  -     brompheniramine-pseudoeph-DM 2-30-10 mg/5 mL syrup; Take 5 mL by mouth 4 (four) times a day as needed for allergies for up to 10 days.  -     albuterol HFA (VENTOLIN HFA) 90 mcg/actuation inhaler; Inhale 2 puffs every 6 (six) hours as needed for wheezing.      Time Spent:  I spent 30 minutes on this date of service performing the following activities: obtaining history, entering orders, documenting, providing counseling and education and coordinating care.

## 2021-09-09 PROBLEM — J40 TRACHEOBRONCHITIS: Status: ACTIVE | Noted: 2021-09-09

## 2021-09-09 PROBLEM — J32.9 RECURRENT SINUSITIS: Status: ACTIVE | Noted: 2021-09-09

## 2021-09-09 ASSESSMENT — ENCOUNTER SYMPTOMS
CHEST TIGHTNESS: 0
SINUS PAIN: 0
UNEXPECTED WEIGHT CHANGE: 0
PALPITATIONS: 0
SORE THROAT: 0
RHINORRHEA: 1
DIZZINESS: 0
HEADACHES: 0
SHORTNESS OF BREATH: 0
SINUS PRESSURE: 1
COUGH: 1

## 2021-09-09 NOTE — ASSESSMENT & PLAN NOTE
She has recurrent sinus symptoms partially treated with the Augmentin.  Feels she does better with Ceftin.  Will prescribe 1 more week of Ceftin along with another Medrol Dosepak.  I referred her to Dr. Tiera Olivera for further ENT evaluation

## 2021-09-09 NOTE — ASSESSMENT & PLAN NOTE
She gets repeated upper respiratory infections.  I am concerned given her history of IgG deficiency.  Would like her to see allergy and immunology.  May need to consider immunoglobulin infusion.  Referred to Dr. Mccarthy at [facility]

## 2022-01-03 ENCOUNTER — TELEPHONE (OUTPATIENT)
Dept: SCHEDULING | Facility: CLINIC | Age: 57
End: 2022-01-03
Payer: OTHER GOVERNMENT

## 2022-01-03 NOTE — TELEPHONE ENCOUNTER
Pt called stating she was unable to make appt tomorrow 1/4/22 due to work being short staffed.    Pt rescheduled to next available appt that worked with her schedule which was 2/7/22.    Pt can be reached at [phone]

## 2022-01-28 ENCOUNTER — TELEPHONE (OUTPATIENT)
Dept: SCHEDULING | Facility: CLINIC | Age: 57
End: 2022-01-28
Payer: OTHER GOVERNMENT

## 2022-01-28 NOTE — TELEPHONE ENCOUNTER
LMOM.  Good afternoon Preeti.  This Ghislaine from Dr. Blount's office.  Dr. Blount received your message.  Cholesterol medication does not cause swelling in feet and ankles.  Perhaps the swelling is from standing all day at your job.  Dr. Blount will see you on your Feb 7 th appointment.  Have a great day.

## 2022-01-28 NOTE — TELEPHONE ENCOUNTER
Pt called c/o swelling in ankles and feet, pt unsure if unsure to standing all day at her job, or her cholesterol medication    [phone]

## 2022-02-02 ENCOUNTER — TELEPHONE (OUTPATIENT)
Dept: SCHEDULING | Facility: CLINIC | Age: 57
End: 2022-02-02
Payer: OTHER GOVERNMENT

## 2022-02-16 LAB
ALBUMIN SERPL-MCNC: 4.6 G/DL (ref 3.8–4.9)
ALBUMIN/GLOB SERPL: 2.9 {RATIO} (ref 1.2–2.2)
ALP SERPL-CCNC: 78 IU/L (ref 44–121)
ALT SERPL-CCNC: 27 IU/L (ref 0–32)
AST SERPL-CCNC: 24 IU/L (ref 0–40)
BASOPHILS # BLD AUTO: 0.1 X10E3/UL (ref 0–0.2)
BASOPHILS NFR BLD AUTO: 1 %
BILIRUB SERPL-MCNC: 0.4 MG/DL (ref 0–1.2)
BUN SERPL-MCNC: 14 MG/DL (ref 6–24)
BUN/CREAT SERPL: 16 (ref 9–23)
CALCIUM SERPL-MCNC: 9.5 MG/DL (ref 8.7–10.2)
CHLORIDE SERPL-SCNC: 102 MMOL/L (ref 96–106)
CHOLEST SERPL-MCNC: 102 MG/DL (ref 100–199)
CO2 SERPL-SCNC: 25 MMOL/L (ref 20–29)
CREAT SERPL-MCNC: 0.88 MG/DL (ref 0.57–1)
EOSINOPHIL # BLD AUTO: 0.2 X10E3/UL (ref 0–0.4)
EOSINOPHIL NFR BLD AUTO: 5 %
ERYTHROCYTE [DISTWIDTH] IN BLOOD BY AUTOMATED COUNT: 12.5 % (ref 11.7–15.4)
GLOBULIN SER CALC-MCNC: 1.6 G/DL (ref 1.5–4.5)
GLUCOSE SERPL-MCNC: 94 MG/DL (ref 65–99)
HCT VFR BLD AUTO: 44.2 % (ref 34–46.6)
HDLC SERPL-MCNC: 46 MG/DL
HGB BLD-MCNC: 15.2 G/DL (ref 11.1–15.9)
IMM GRANULOCYTES # BLD AUTO: 0 X10E3/UL (ref 0–0.1)
IMM GRANULOCYTES NFR BLD AUTO: 0 %
LAB CORP EGFR IF AFRICN AM: 85 ML/MIN/1.73
LAB CORP EGFR IF NONAFRICN AM: 74 ML/MIN/1.73
LDLC SERPL CALC-MCNC: 42 MG/DL (ref 0–99)
LYMPHOCYTES # BLD AUTO: 1.2 X10E3/UL (ref 0.7–3.1)
LYMPHOCYTES NFR BLD AUTO: 26 %
MCH RBC QN AUTO: 32 PG (ref 26.6–33)
MCHC RBC AUTO-ENTMCNC: 34.4 G/DL (ref 31.5–35.7)
MCV RBC AUTO: 93 FL (ref 79–97)
MONOCYTES # BLD AUTO: 0.3 X10E3/UL (ref 0.1–0.9)
MONOCYTES NFR BLD AUTO: 7 %
NEUTROPHILS # BLD AUTO: 2.8 X10E3/UL (ref 1.4–7)
NEUTROPHILS NFR BLD AUTO: 61 %
PLATELET # BLD AUTO: 265 X10E3/UL (ref 150–450)
POTASSIUM SERPL-SCNC: 3.9 MMOL/L (ref 3.5–5.2)
PROT SERPL-MCNC: 6.2 G/DL (ref 6–8.5)
RBC # BLD AUTO: 4.75 X10E6/UL (ref 3.77–5.28)
SODIUM SERPL-SCNC: 142 MMOL/L (ref 134–144)
TRIGL SERPL-MCNC: 65 MG/DL (ref 0–149)
VLDLC SERPL CALC-MCNC: 14 MG/DL (ref 5–40)
WBC # BLD AUTO: 4.6 X10E3/UL (ref 3.4–10.8)

## 2022-03-17 NOTE — PROGRESS NOTES
Cardiology Consult/New Patient    Rosa Melendze MD          Preeti Hyde is a 56 y.o. female identifies as who presents with     She returns for follow-up of lab work after beginning rosuvastatin  She had CT angiogram in August that showed minimal soft plaque in her LAD with coronary calcium score of 1  She has strong family history premature cardiac disease  Dad had a heart attack at age 50 and multiple family members on that side of the family   Lipid profile now looks perfect    Lab work February 2022  CBC normal creatinine 0.88 potassium 3.9    Cholesterol 102 LDL 42 triglycerides 65                                      Patient Active Problem List    Diagnosis Date Noted   • Localized edema 03/22/2022   • Recurrent sinusitis 09/09/2021   • Tracheobronchitis 09/09/2021   • Coronary artery disease involving native coronary artery of native heart without angina pectoris 08/12/2021   • Family history of early CAD 05/27/2021   • Urinary frequency 05/18/2021   • Chest discomfort 04/17/2021   • Seasonal allergies 04/17/2021   • Essential hypertension 04/15/2021       Medical History:   Past Medical History:   Diagnosis Date   • Allergies    • COVID-19    • GERD (gastroesophageal reflux disease)    • Hypertension        Surgical History:   Past Surgical History:   Procedure Laterality Date   • TONSILLECTOMY     • WISDOM TOOTH EXTRACTION         Allergies: Demerol [meperidine] and Pneumococcal vaccine    Current Outpatient Medications   Medication Sig Dispense Refill   • albuterol HFA (VENTOLIN HFA) 90 mcg/actuation inhaler      • albuterol HFA (VENTOLIN HFA) 90 mcg/actuation inhaler Inhale 2 puffs every 6 (six) hours as needed for wheezing. 18 g 1   • brompheniramine-pseudoeph-DM 2-30-10 mg/5 mL syrup      • cefuroxime (CEFTIN) 500 mg tablet Take 500 mg by mouth 2 (two) times a day. for 10 days     • doxycycline hyclate (VIBRA-TABS) 100 mg tablet      • hydrochlorothiazide (HYDRODIURIL) 25 mg tablet Take 1  tablet (25 mg total) by mouth daily. 90 tablet 1   • levocetirizine (XYZAL) 5 mg tablet Take by mouth.     • montelukast (SINGULAIR) 10 mg tablet Take 10 mg by mouth daily.     • potassium chloride (KLOR-CON M) 20 mEq CR tablet Take 1 tablet (20 mEq total) by mouth 2 (two) times a day. 180 tablet 1   • rosuvastatin (CRESTOR) 20 mg tablet Take 1 tablet (20 mg total) by mouth daily. 90 tablet 1   • torsemide (DEMADEX) 20 mg tablet Take 1 tablet (20 mg total) by mouth daily. 90 tablet 1   • valACYclovir (VALTREX) 500 mg tablet Take 500 mg by mouth daily.       No current facility-administered medications for this visit.       Social History:   Social History     Socioeconomic History   • Marital status:      Spouse name: None   • Number of children: None   • Years of education: None   • Highest education level: None   Tobacco Use   • Smoking status: Never Smoker   • Smokeless tobacco: Never Used   Vaping Use   • Vaping Use: Never used   Substance and Sexual Activity   • Alcohol use: Yes   • Drug use: Never   • Sexual activity: Yes     Partners: Male     Social Determinants of Health     Food Insecurity: No Food Insecurity   • Worried About Running Out of Food in the Last Year: Never true   • Ran Out of Food in the Last Year: Never true       Family History:   Family History   Problem Relation Age of Onset   • Lymphoma Biological Mother    • Heart attack Biological Father    • Aortic aneurysm Biological Father    • Hypertension Biological Father    • Stroke Maternal Grandfather    • Angina Paternal Grandmother    • Heart attack Paternal Grandmother    • Stroke Paternal Grandfather        Review of Systems   ROS    Objective       Vitals:    03/22/22 1511   BP: 138/80   Pulse: 100   SpO2: 99%       Physical Exam  Constitutional:       General: She is not in acute distress.     Appearance: She is well-developed.   HENT:      Head: Normocephalic and atraumatic.      Nose: Nose normal.   Eyes:      General: No  scleral icterus.     Conjunctiva/sclera: Conjunctivae normal.   Neck:      Vascular: No JVD.   Cardiovascular:      Rate and Rhythm: Normal rate and regular rhythm.      Pulses: Intact distal pulses.      Heart sounds: Normal heart sounds. No murmur heard.    No friction rub. No gallop.   Pulmonary:      Effort: Pulmonary effort is normal. No respiratory distress.      Breath sounds: No stridor. No wheezing or rales.   Chest:      Chest wall: No tenderness.   Abdominal:      Tenderness: There is no abdominal tenderness.   Musculoskeletal:         General: No deformity.   Skin:     General: Skin is warm and dry.   Neurological:      Mental Status: She is alert and oriented to person, place, and time.          Labs   Lab Results   Component Value Date    WBC 4.6 02/15/2022    HGB 15.2 02/15/2022    HCT 44.2 02/15/2022     02/15/2022    CHOL 102 02/15/2022    TRIG 65 02/15/2022    HDL 46 02/15/2022    ALT 27 02/15/2022    AST 24 02/15/2022     02/15/2022    K 3.9 02/15/2022     02/15/2022    CREATININE 0.88 02/15/2022    BUN 14 02/15/2022    CO2 25 02/15/2022    TSH 2.330 06/22/2021   [id number]    Imaging    ECHO  Echo normal 8/2021        STRESS  Stress nuc 2018 neg normal ejection fraction 60%            CAT    COR CTA 8/2021  SUMMARY:  1. 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  2. Cardiac Structure:  Normal.  3. Left Ventricular size and function:  Normal, EF 68%.  4. Coronary calcium score 1.  This places the patient in the ACOSTA 74th risk  percentile for age and gender matched individuals.  5. Overall quality of the scan was good.   CORONARY ARTERIES:     LEFT MAIN: Normal.     LAD:  Proximal: 40-50% proximal stenosis.  FFR CT 0.92.  Mid: Normal.  Distal: Normal.  DIAG 1: Normal.  DIAG 2: Normal.     RAMUS: Normal.     LCX:  Proximal: Normal.  Mid: Normal.  Distal: Normal.  OM1:  Normal.  OM2:   Normal.     RCA:  Proximal: Normal.  Mid: Normal.  Distal: Normal.  RPDA: Normal.  RPLB: Normal.             March 2022 poor R progression nonspecific T wave changes stable                        Assessment/Plan     Coronary artery disease involving native coronary artery of native heart without angina pectoris  Coronary CT angiogram August 2021 showed minimal plaque in her coronary arteries with accompanying calcium score of 1 compatible with soft plaque she has a strong family history of premature cardiac disease preserved LV systolic function started on statin therapy with good response LDL cholesterol is 42, down from 131    Localized edema  She has problems with peripheral edema candidate she has normal LV systolic function I believe this is probably a local venous phenomenon related to insufficiency and being overweight I asked to use as needed loop diuretics potassium and compression stockings    Family history of early CAD  Her father age 50               She had recent coronary CT angiogram with minimal calcification of 1 some soft plaque with her strong family history of premature cardiac disease on her father side and mild hyperlipidemia we went over risks and benefits of statin therapy and she agrees to begin rosuvastatin 20  Cholesterol is at goal  She has a remedy edema that is most likely venous insufficiency exacerbated by being overweight  We will give her loop diuretic with potassium to take on a as needed basis just for a few days when her feet are swollen  I will see her back in 1 year with lab work we will probably repeat coronary CT angiogram in 5 years                      Guicho Blount MD  3/22/2022

## 2022-03-22 ENCOUNTER — OFFICE VISIT (OUTPATIENT)
Dept: CARDIOLOGY | Facility: CLINIC | Age: 57
End: 2022-03-22
Payer: OTHER GOVERNMENT

## 2022-03-22 VITALS — OXYGEN SATURATION: 99 % | HEART RATE: 100 BPM | SYSTOLIC BLOOD PRESSURE: 138 MMHG | DIASTOLIC BLOOD PRESSURE: 80 MMHG

## 2022-03-22 DIAGNOSIS — R60.0 LOCALIZED EDEMA: ICD-10-CM

## 2022-03-22 DIAGNOSIS — I10 ESSENTIAL HYPERTENSION: ICD-10-CM

## 2022-03-22 DIAGNOSIS — Z82.49 FAMILY HISTORY OF EARLY CAD: ICD-10-CM

## 2022-03-22 DIAGNOSIS — I25.10 CORONARY ARTERY DISEASE INVOLVING NATIVE CORONARY ARTERY OF NATIVE HEART WITHOUT ANGINA PECTORIS: Primary | ICD-10-CM

## 2022-03-22 PROCEDURE — 99214 OFFICE O/P EST MOD 30 MIN: CPT | Performed by: INTERNAL MEDICINE

## 2022-03-22 PROCEDURE — 93000 ELECTROCARDIOGRAM COMPLETE: CPT | Performed by: INTERNAL MEDICINE

## 2022-03-22 RX ORDER — POTASSIUM CHLORIDE 20 MEQ/1
20 TABLET, EXTENDED RELEASE ORAL 2 TIMES DAILY
Qty: 180 TABLET | Refills: 1 | Status: SHIPPED | OUTPATIENT
Start: 2022-03-22 | End: 2022-10-07 | Stop reason: ALTCHOICE

## 2022-03-22 RX ORDER — CEFUROXIME AXETIL 500 MG/1
500 TABLET ORAL
COMMUNITY
Start: 2022-03-14 | End: 2022-05-12

## 2022-03-22 RX ORDER — TORSEMIDE 20 MG/1
20 TABLET ORAL DAILY
Qty: 90 TABLET | Refills: 1 | Status: SHIPPED | OUTPATIENT
Start: 2022-03-22 | End: 2022-10-07 | Stop reason: ALTCHOICE

## 2022-03-22 RX ORDER — LEVOCETIRIZINE DIHYDROCHLORIDE 5 MG/1
TABLET, FILM COATED ORAL
COMMUNITY
End: 2022-10-13

## 2022-03-22 NOTE — ASSESSMENT & PLAN NOTE
Coronary CT angiogram August 2021 showed minimal plaque in her coronary arteries with accompanying calcium score of 1 compatible with soft plaque she has a strong family history of premature cardiac disease preserved LV systolic function started on statin therapy with good response LDL cholesterol is 42, down from 131

## 2022-03-22 NOTE — ASSESSMENT & PLAN NOTE
She has problems with peripheral edema candidate she has normal LV systolic function I believe this is probably a local venous phenomenon related to insufficiency and being overweight I asked to use as needed loop diuretics potassium and compression stockings

## 2022-05-12 ENCOUNTER — APPOINTMENT (EMERGENCY)
Dept: RADIOLOGY | Facility: HOSPITAL | Age: 57
End: 2022-05-12
Payer: OTHER GOVERNMENT

## 2022-05-12 ENCOUNTER — HOSPITAL ENCOUNTER (EMERGENCY)
Facility: HOSPITAL | Age: 57
Discharge: HOME | End: 2022-05-12
Attending: EMERGENCY MEDICINE
Payer: OTHER GOVERNMENT

## 2022-05-12 VITALS
DIASTOLIC BLOOD PRESSURE: 85 MMHG | RESPIRATION RATE: 14 BRPM | BODY MASS INDEX: 32.58 KG/M2 | TEMPERATURE: 97.5 F | WEIGHT: 215 LBS | HEIGHT: 68 IN | HEART RATE: 77 BPM | SYSTOLIC BLOOD PRESSURE: 137 MMHG | OXYGEN SATURATION: 96 %

## 2022-05-12 DIAGNOSIS — S82.839A CLOSED FRACTURE OF DISTAL END OF FIBULA, UNSPECIFIED FRACTURE MORPHOLOGY, INITIAL ENCOUNTER: Primary | ICD-10-CM

## 2022-05-12 PROCEDURE — 2W3RX1Z IMMOBILIZATION OF LEFT LOWER LEG USING SPLINT: ICD-10-PCS | Performed by: EMERGENCY MEDICINE

## 2022-05-12 PROCEDURE — 99283 EMERGENCY DEPT VISIT LOW MDM: CPT | Mod: 25

## 2022-05-12 PROCEDURE — 73610 X-RAY EXAM OF ANKLE: CPT | Mod: LT

## 2022-05-12 PROCEDURE — 63700000 HC SELF-ADMINISTRABLE DRUG: Performed by: NURSE PRACTITIONER

## 2022-05-12 PROCEDURE — 29515 APPLICATION SHORT LEG SPLINT: CPT | Performed by: NURSE PRACTITIONER

## 2022-05-12 RX ORDER — OXYCODONE AND ACETAMINOPHEN 5; 325 MG/1; MG/1
1 TABLET ORAL ONCE
Status: COMPLETED | OUTPATIENT
Start: 2022-05-12 | End: 2022-05-12

## 2022-05-12 RX ORDER — OXYCODONE AND ACETAMINOPHEN 5; 325 MG/1; MG/1
1 TABLET ORAL EVERY 6 HOURS PRN
Qty: 12 TABLET | Refills: 0 | Status: SHIPPED | OUTPATIENT
Start: 2022-05-12 | End: 2022-09-01 | Stop reason: ALTCHOICE

## 2022-05-12 RX ADMIN — OXYCODONE HYDROCHLORIDE AND ACETAMINOPHEN 1 TABLET: 5; 325 TABLET ORAL at 11:45

## 2022-05-12 ASSESSMENT — ENCOUNTER SYMPTOMS
NUMBNESS: 0
BRUISES/BLEEDS EASILY: 0
WOUND: 0
WEAKNESS: 0
ARTHRALGIAS: 1

## 2022-05-12 NOTE — ED PROVIDER NOTES
Emergency Medicine Note  HPI   HISTORY OF PRESENT ILLNESS     56-year-old female presents to the emergency department with complaint of left ankle injury that occurred this morning.  Patient dates she was walking down the stairs when she missed a step and landed directly onto her left ankle.  Reports that she did invert the ankle with impact.  Patient complained of immediate pain in the left ankle with associated swelling.  Patient has been unable to weight-bear secondary to pain.  Denies feeling associated numbness or weakness in her left foot or toes.  Patient denies injury or pain to her left knee.  No open wound or laceration.      Ankle Pain        Patient History   PAST HISTORY     Reviewed from Nursing Triage:         Past Medical History:   Diagnosis Date   • Allergies    • COVID-19    • GERD (gastroesophageal reflux disease)    • Hypertension    • Lipid disorder        Past Surgical History:   Procedure Laterality Date   • TONSILLECTOMY     • WISDOM TOOTH EXTRACTION         Family History   Problem Relation Age of Onset   • Lymphoma Biological Mother    • Heart attack Biological Father    • Aortic aneurysm Biological Father    • Hypertension Biological Father    • Stroke Maternal Grandfather    • Angina Paternal Grandmother    • Heart attack Paternal Grandmother    • Stroke Paternal Grandfather        Social History     Tobacco Use   • Smoking status: Never Smoker   • Smokeless tobacco: Never Used   Vaping Use   • Vaping Use: Never used   Substance Use Topics   • Alcohol use: Yes     Comment: rare   • Drug use: Never         Review of Systems   REVIEW OF SYSTEMS     Review of Systems   Musculoskeletal: Positive for arthralgias (Left ankle).   Skin: Negative for wound.   Neurological: Negative for weakness and numbness.   Hematological: Does not bruise/bleed easily.         VITALS     ED Vitals    Date/Time Temp Pulse Resp BP SpO2 [facility]   05/12/22 1022 36.4 °C (97.5 °F) 77 14 137/85 96 % SJD        Pulse Ox %:  96 % (05/12/22 1141)  Pulse Ox Interpretation: Normal (05/12/22 1141)           Physical Exam   PHYSICAL EXAM     Physical Exam  Vitals and nursing note reviewed.   Constitutional:       Appearance: Normal appearance.   HENT:      Head: Atraumatic.   Pulmonary:      Effort: Pulmonary effort is normal. No respiratory distress.   Musculoskeletal:      Cervical back: Normal range of motion.      Left ankle: Swelling present. No deformity, ecchymosis or lacerations. Tenderness present over the lateral malleolus. No base of 5th metatarsal or proximal fibula tenderness.   Skin:     General: Skin is warm and dry.      Capillary Refill: Capillary refill takes less than 2 seconds.   Neurological:      General: No focal deficit present.      Mental Status: She is alert and oriented to person, place, and time.   Psychiatric:         Mood and Affect: Mood normal.         Behavior: Behavior normal.           PROCEDURES     Splint Application    Date/Time: 5/12/2022 12:11 PM  Performed by: Pallante, Elizabeth P, CRNP  Authorized by: Manoj Gaspar MD     Consent:     Consent obtained:  Verbal    Consent given by:  Patient    Risks, benefits, and alternatives were discussed: yes    Injury:     Injury location:  Ankle    Ankle injury location:  L ankle    Ankle fracture type comment:  Distal fibula fracture  Pre-procedure details:     Distal neurologic exam:  Normal    Distal perfusion: distal pulses strong and brisk capillary refill    Sedation:     Sedation type:  None  Procedure details:     Immobilization:  Splint    Splint type:  Ankle stirrup and short leg    Supplies used:  Fiberglass  Post-procedure details:     Distal neurologic exam:  Normal    Distal perfusion: brisk capillary refill      Procedure completion:  Tolerated         DATA     Results     None          Imaging Results          X-RAY ANKLE LEFT 3+ VIEWS (Final result)  Result time 05/12/22 10:46:52    Final result                 Impression:     IMPRESSION:  Comminuted left distal fibular fracture is noted.             Narrative:    CLINICAL HISTORY: Fall, medial and lateral ankle pain  COMMENT:  COMPARISON: None.  TECHNIQUE: 4 views of ankle were obtained.  FINDINGS:  Comminuted left distal fibular fracture is noted. Ankle mortise is intact. Small  calcaneal enthesophyte is noted. Soft tissues swelling seen at the left ankle.                              No orders to display       Scoring tools                                 ED Course & MDM   MDM / ED COURSE / CLINICAL IMPRESSIONS / DISPO     MDM  Number of Diagnoses or Management Options     Amount and/or Complexity of Data Reviewed  Tests in the radiology section of CPT®: reviewed  Discuss the patient with other providers: yes  Independent visualization of images, tracings, or specimens: yes    Patient Progress  Patient progress: stable      ED Course as of 05/12/22 1212   Thu May 12, 2022   1125 X-RAY ANKLE LEFT 3+ VIEWS  IMPRESSION:  Comminuted left distal fibular fracture is noted. [EP]   1202 Reviewed with patient ankle x-rays, fitted with temporary cast with instructions on use.  Fitted with crutches with instructions on nonweightbearing until evaluated by podiatry.  Counseled to ice/rest/elevate, Motrin/Percocet for pain, close follow-up with podiatry and return precautions [EP]      ED Course User Index  [EP] Pallante, Elizabeth P, CRNP         Clinical Impressions as of 05/12/22 1212   Closed fracture of distal end of fibula, unspecified fracture morphology, initial encounter     Discharge         Pallante, Elizabeth P, CRNP  05/12/22 1212

## 2022-05-12 NOTE — DISCHARGE INSTRUCTIONS
X-rays show a left distal fibula fracture.  You were fitted with a temporary cast in the emergency department.  Do not remove this cast and do not get the cast wet.  You will need to follow-up with podiatry for continued evaluation and management.    Do not weight-bear on the left ankle and use crutches to ambulate until you are evaluated by podiatry.    Keep the left ankle elevated when resting to reduce pain and swelling.  Apply ice for 20 minutes at a time to reduce pain and swelling.    Take Motrin/ibuprofen (600 mg) every 6 hours as needed for pain.  If having severe pain you may take the Percocet.  The Percocet will make you drowsy, no driving or drinking alcohol when taking this medication.    If you develop worsening or more severe pain (that you cannot alleviate with medications), cold/dusky blue foot or toe, persistent numbness in your left toes or any other new or concerning symptoms return to the emergency department.

## 2022-05-13 ENCOUNTER — TELEPHONE (OUTPATIENT)
Dept: INTERNAL MEDICINE | Facility: CLINIC | Age: 57
End: 2022-05-13
Payer: OTHER GOVERNMENT

## 2022-05-13 NOTE — ED ATTESTATION NOTE
The patient was evaluated and managed by the physician assistant / nurse practitioner. I agree.  Discussed the case with the nurse practitioner saw and evaluated the patient.  The patient is a 56-year-old female who presented to the emergency department for evaluation of left ankle pain.  Patient had a trip and fall walking down steps injuring her left ankle.  Ankle inversion injury.  Pain with attempted weightbearing.  Vital signs reviewed.  X-ray results reviewed.  Patient has evidence of distal fibular fracture.  Plan crutches, splint.  With podiatrist.    X-RAY ANKLE LEFT 3+ VIEWS   Final Result   IMPRESSION:   Comminuted left distal fibular fracture is noted.             Manoj Gaspar MD  05/12/22 2044

## 2022-05-13 NOTE — TELEPHONE ENCOUNTER
ED telephone follow up attempted, but pt was not available. Per ED record, pt presented to the ED after injury Lt ankle. Pt was found Lt distal fibula fracture from xray. Pt was applied splint on her Lt leg and advised not weight bearing and use crutches when she moves around.  Pt was advised to make an appointment with Dr. Augustin Akbar, Podiatric surgery to follow up. I left voicemail to call us back if she has any questions or concerns.

## 2022-05-16 NOTE — TELEPHONE ENCOUNTER
The patient called back and LVM with service  that she saw podiatrist in [place] last Thursday and given Booty/cast.  The patient has another appt Wed. 5/18/22 and given 6-8 weeks recovery time.

## 2022-05-19 ENCOUNTER — TRANSCRIBE ORDERS (OUTPATIENT)
Dept: SCHEDULING | Age: 57
End: 2022-05-19

## 2022-05-19 DIAGNOSIS — S82.435D: Primary | ICD-10-CM

## 2022-05-20 ENCOUNTER — HOSPITAL ENCOUNTER (OUTPATIENT)
Dept: RADIOLOGY | Facility: HOSPITAL | Age: 57
Discharge: HOME | End: 2022-05-20
Attending: PODIATRIST
Payer: OTHER GOVERNMENT

## 2022-05-20 DIAGNOSIS — S82.435D: ICD-10-CM

## 2022-05-20 PROCEDURE — 73610 X-RAY EXAM OF ANKLE: CPT | Mod: LT

## 2022-06-15 ENCOUNTER — HOSPITAL ENCOUNTER (OUTPATIENT)
Dept: RADIOLOGY | Facility: HOSPITAL | Age: 57
Discharge: HOME | End: 2022-06-15
Attending: PODIATRIST
Payer: OTHER GOVERNMENT

## 2022-06-15 DIAGNOSIS — S82.435D: ICD-10-CM

## 2022-06-15 PROCEDURE — 73610 X-RAY EXAM OF ANKLE: CPT | Mod: LT

## 2022-06-16 ENCOUNTER — APPOINTMENT (EMERGENCY)
Dept: RADIOLOGY | Facility: HOSPITAL | Age: 57
End: 2022-06-16
Payer: OTHER GOVERNMENT

## 2022-06-16 ENCOUNTER — HOSPITAL ENCOUNTER (EMERGENCY)
Facility: HOSPITAL | Age: 57
Discharge: HOME | End: 2022-06-16
Attending: EMERGENCY MEDICINE | Admitting: EMERGENCY MEDICINE
Payer: OTHER GOVERNMENT

## 2022-06-16 ENCOUNTER — TRANSCRIBE ORDERS (OUTPATIENT)
Dept: SCHEDULING | Age: 57
End: 2022-06-16

## 2022-06-16 VITALS
HEART RATE: 89 BPM | TEMPERATURE: 97.7 F | OXYGEN SATURATION: 98 % | DIASTOLIC BLOOD PRESSURE: 83 MMHG | HEIGHT: 68 IN | RESPIRATION RATE: 18 BRPM | BODY MASS INDEX: 32.58 KG/M2 | SYSTOLIC BLOOD PRESSURE: 157 MMHG | WEIGHT: 215 LBS

## 2022-06-16 DIAGNOSIS — S82.435D: Primary | ICD-10-CM

## 2022-06-16 DIAGNOSIS — M79.89 LEG SWELLING: Primary | ICD-10-CM

## 2022-06-16 PROCEDURE — 99284 EMERGENCY DEPT VISIT MOD MDM: CPT | Mod: 25

## 2022-06-16 PROCEDURE — 93971 EXTREMITY STUDY: CPT | Mod: LT

## 2022-06-16 ASSESSMENT — ENCOUNTER SYMPTOMS
CONSTITUTIONAL NEGATIVE: 1
HEADACHES: 0
FATIGUE: 0
FEVER: 0
BACK PAIN: 0
NUMBNESS: 0
NEUROLOGICAL NEGATIVE: 1
PALPITATIONS: 0
CHEST TIGHTNESS: 0
WOUND: 0
LIGHT-HEADEDNESS: 0
FLANK PAIN: 0
GASTROINTESTINAL NEGATIVE: 1
ABDOMINAL PAIN: 0
FACIAL SWELLING: 0
VOMITING: 0
TROUBLE SWALLOWING: 0
DIZZINESS: 0
MUSCULOSKELETAL NEGATIVE: 1
PSYCHIATRIC NEGATIVE: 1
WEAKNESS: 0
EYES NEGATIVE: 1
NECK PAIN: 0
CHILLS: 0
SHORTNESS OF BREATH: 0
RESPIRATORY NEGATIVE: 1
HEMATURIA: 0
NAUSEA: 0
NECK STIFFNESS: 0

## 2022-06-17 ENCOUNTER — TELEPHONE (OUTPATIENT)
Dept: INTERNAL MEDICINE | Facility: CLINIC | Age: 57
End: 2022-06-17
Payer: OTHER GOVERNMENT

## 2022-06-17 NOTE — ED PROVIDER NOTES
"HPI    Chief Complaint   Patient presents with   • Ankle Pain        HPI   56-year-old female with past medical history significant for high cholesterol, hypertension, GERD and recent left distal fibula fracture presents for evaluation of increasing swelling to the knee and thigh that she states she believes started approximately 1 week ago.  She was seen here by another provider on 5/12 after a fall and diagnosed with a distal fibula fracture, she was immobilized and followed up with podiatry where she was placed in a boot and cast.  She had another follow-up appointment with them today and new wrap was placed and she was placed back in her boot.  She states that the swelling is above the boot and spans to her knee and thigh, she believes it has been present for approximately 1 week.  Denying any new injury or trauma.  Her podiatrist did see this today and referred her to an orthopedic doctor for further evaluation, however she states \"I just wanted to be safe and make sure he was not a blood clot.\"  She does not have any history of blood clots, she has been taking 81 mg of aspirin daily since the surgery.  She states she has been sitting more so than ever since this injury.  She denies any skin changes including rash, redness or warmth.  She denies any fever or chills.  She denies any pain or swelling in the calf or ankle and states that the ankle is actually less swollen than the right side.  She denies any numbness or tingling to the extremity or any extremity weakness.  She is denying any chest pain, chest pressure, shortness of breath, palpitations, tachycardia, diaphoresis, headache, dizziness or syncope.    Past Medical History:   Diagnosis Date   • Allergies    • COVID-19    • GERD (gastroesophageal reflux disease)    • Hypertension    • Lipid disorder          Past Surgical History:   Procedure Laterality Date   • TONSILLECTOMY     • WISDOM TOOTH EXTRACTION         Family History   Problem Relation Age of " "Onset   • Lymphoma Biological Mother    • Heart attack Biological Father    • Aortic aneurysm Biological Father    • Hypertension Biological Father    • Stroke Maternal Grandfather    • Angina Paternal Grandmother    • Heart attack Paternal Grandmother    • Stroke Paternal Grandfather        Social History     Tobacco Use   • Smoking status: Never Smoker   • Smokeless tobacco: Never Used   Vaping Use   • Vaping Use: Never used   Substance Use Topics   • Alcohol use: Yes     Comment: rare   • Drug use: Never       Systems Reviewed from Nursing Triage:                 Review of Systems   Constitutional: Negative.  Negative for chills, fatigue and fever.   HENT: Negative.  Negative for facial swelling and trouble swallowing.    Eyes: Negative.  Negative for visual disturbance.   Respiratory: Negative.  Negative for chest tightness and shortness of breath.    Cardiovascular: Positive for leg swelling. Negative for chest pain and palpitations.   Gastrointestinal: Negative.  Negative for abdominal pain, nausea and vomiting.   Genitourinary: Negative.  Negative for flank pain and hematuria.   Musculoskeletal: Negative.  Negative for back pain, neck pain and neck stiffness.   Skin: Negative.  Negative for rash and wound.   Neurological: Negative.  Negative for dizziness, syncope, weakness, light-headedness, numbness and headaches.   Psychiatric/Behavioral: Negative.  Negative for suicidal ideas.            ED Triage Vitals [06/16/22 2006]   Temp Heart Rate Resp BP SpO2   36.5 °C (97.7 °F) 89 18 (!) 157/83 98 %      Temp Source Heart Rate Source Patient Position BP Location FiO2 (%) (Set)   Temporal -- -- -- --       Vitals:    06/16/22 2006   BP: (!) 157/83   Pulse: 89   Resp: 18   Temp: 36.5 °C (97.7 °F)   TempSrc: Temporal   SpO2: 98%   Weight: 97.5 kg (215 lb)   Height: 1.727 m (5' 8\")                         Physical Exam  Constitutional:       General: She is not in acute distress.     Appearance: Normal appearance. " She is not toxic-appearing.   HENT:      Head: Normocephalic.      Mouth/Throat:      Mouth: Mucous membranes are moist.      Pharynx: Oropharynx is clear.   Eyes:      Conjunctiva/sclera: Conjunctivae normal.      Pupils: Pupils are equal, round, and reactive to light.   Cardiovascular:      Rate and Rhythm: Normal rate.      Pulses: Normal pulses.   Pulmonary:      Effort: Pulmonary effort is normal. No respiratory distress.      Breath sounds: Normal breath sounds.   Abdominal:      Palpations: Abdomen is soft.      Tenderness: There is no abdominal tenderness. There is no guarding.   Musculoskeletal:      Cervical back: Neck supple. No rigidity or tenderness.      Comments: Diffuse edema to the anterior knee joint and up the anterior thigh, skin is normal in appearance without erythema, rash or warmth.  Distal pulses are intact and equal bilaterally and she has normal sensation to all the toes.  She is nontender to palpation over the joint.   Skin:     General: Skin is warm.      Capillary Refill: Capillary refill takes less than 2 seconds.   Neurological:      General: No focal deficit present.      Mental Status: She is alert and oriented to person, place, and time.   Psychiatric:         Behavior: Behavior normal.              US venous leg, LL extremity   Final Result   IMPRESSION:   No sonographic evidence of acute deep venous thrombosis in the femoral-popliteal   system in the left lower extremity, noting that the calf veins are not well   seen..      COMMENT:   Real-time duplex sonography of the deep veins of the left lower extremity was   performed with color and spectral Doppler including compression.      The common femoral, superficial femoral and popliteal veins are normally   compressible with spontaneous phasic wave forms in the left lower extremity.  No   intraluminal filling defect is identified. There is normal color Doppler flow.   The calf veins are not well seen.                   Labs  Reviewed - No data to display    US venous leg, LL extremity   Final Result   IMPRESSION:   No sonographic evidence of acute deep venous thrombosis in the femoral-popliteal   system in the left lower extremity, noting that the calf veins are not well   seen..      COMMENT:   Real-time duplex sonography of the deep veins of the left lower extremity was   performed with color and spectral Doppler including compression.      The common femoral, superficial femoral and popliteal veins are normally   compressible with spontaneous phasic wave forms in the left lower extremity.  No   intraluminal filling defect is identified. There is normal color Doppler flow.   The calf veins are not well seen.                     Procedures    Final diagnoses:   [M79.89] Leg swelling       ED Course & MDM     Clinical Impressions as of 06/16/22 2234   Leg swelling           MDM    10:34 PM    Impression: Left leg edema, rule out DVT    Plan: Ultrasound left lower extremity    PATIENT REMOVED BLACK BOOT BUT DOES NOT WANT TO TAKE DOWN ALL OF THE WRAPPING THAT WAS JUST REPLACED BY PODIATRY TODAY.  SHE STATES THAT THE SWELLING SHE PREVIOUSLY HAD IN HER ANKLE AND CALF HAS RESOLVED AND THAT IT IS ACTUALLY LESS SWOLLEN IN HER FOOT/ANKLE/CALF THAN IT IS ON HER RIGHT SIDE.  HER SWELLING IS ON HER KNEE AND THIGH AND ULTRASOUND DOES NOT SHOW ANY EVIDENCE OF DVT IN THESE AREAS.  THIS SWELLING IS LIKELY FROM HER ELEVATION OF HER LEG AS THE SWELLING HAS RESOLVED IN THE LOWER PART OF HER LEG.    Vital Signs Review: Vital signs have been reviewed. The oxygen saturation is  SpO2: 98 % which is within normal limits.    She is scheduled to see ortho for follow-up regarding her knee/thigh swelling.  Patient is medically stable, alert and active here in the emergency department and in no acute distress.  Instructed to follow-up with primary care doctor for repeat evaluation within the next 1 to 2 days, take all usual medications as prescribed and return to  the emergency department for any new or worsening symptoms.  Patient is comfortable with discharge instructions and all questions were answered.    DEBORA Diaz  6/16/2022          This document was created using dragon dictation software.  There might be some typographical errors due to this technology.     Susy Hunter PA C  06/16/22 0226

## 2022-06-17 NOTE — TELEPHONE ENCOUNTER
ED telephone follow up made with patient. Pt presented to the ED with swollen knee up to her thigh. Pt had Lt distal fibula fracture on 5/12 and has been following by Dr. Story had xrays pretty much every 2 weeks.  Pt had placed a boot and cast last ED visit. Her swollen was getting worsening and concerned about blood clots.  Ultrasound of LE showed negative for blood clots and pt was recommended to follow with orthopedics. Pt is scheduled with Dr. Caio Herndon, orthopedics on 6/29. Pt has been using ice packs and elevating her leg to decrease swollen.  Pt will have CT scan to make sure that she does have any fractures which were not found on xray.  Pt will call us for an appointment with Dr. Lechuga once she feels better.

## 2022-06-21 ENCOUNTER — HOSPITAL ENCOUNTER (OUTPATIENT)
Dept: RADIOLOGY | Facility: HOSPITAL | Age: 57
Discharge: HOME | End: 2022-06-21
Attending: PODIATRIST
Payer: OTHER GOVERNMENT

## 2022-06-21 DIAGNOSIS — S82.435D: ICD-10-CM

## 2022-06-21 PROCEDURE — 73700 CT LOWER EXTREMITY W/O DYE: CPT | Mod: LT

## 2022-06-21 PROCEDURE — 76377 3D RENDER W/INTRP POSTPROCES: CPT

## 2022-07-15 ENCOUNTER — TELEPHONE (OUTPATIENT)
Dept: SCHEDULING | Facility: CLINIC | Age: 57
End: 2022-07-15
Payer: OTHER GOVERNMENT

## 2022-07-15 RX ORDER — ROSUVASTATIN CALCIUM 20 MG/1
TABLET, COATED ORAL
Qty: 90 TABLET | Refills: 1 | Status: SHIPPED | OUTPATIENT
Start: 2022-07-15 | End: 2022-07-19 | Stop reason: SDUPTHER

## 2022-07-15 RX ORDER — ROSUVASTATIN CALCIUM 20 MG/1
TABLET, COATED ORAL
Qty: 90 TABLET | Refills: 1 | Status: CANCELLED | OUTPATIENT
Start: 2022-07-15

## 2022-07-15 NOTE — TELEPHONE ENCOUNTER
Medicine Refill Request    Name of Patient: Preeti Hyde    Caller's name: Preeti    Relationship to patient: self    Callback number: [phone]    Medication Name, Dosage, Supply:   rosuvastation 20 mg, 90#    Pharmacy: [facility]      Last Office: Visit date not found   Last Consult Visit: Visit date not found  Last Telemedicine Visit: Visit date not found    Next Appointment: Visit date not found      Current Outpatient Medications:   •  albuterol HFA (VENTOLIN HFA) 90 mcg/actuation inhaler, , Disp: , Rfl:   •  brompheniramine-pseudoeph-DM 2-30-10 mg/5 mL syrup, , Disp: , Rfl:   •  hydrochlorothiazide (HYDRODIURIL) 25 mg tablet, Take 1 tablet (25 mg total) by mouth daily., Disp: 90 tablet, Rfl: 1  •  levocetirizine (XYZAL) 5 mg tablet, Take by mouth., Disp: , Rfl:   •  montelukast (SINGULAIR) 10 mg tablet, Take 10 mg by mouth daily., Disp: , Rfl:   •  oxyCODONE-acetaminophen (PERCOCET) 5-325 mg per tablet, Take 1 tablet by mouth every 6 (six) hours as needed for severe pain for up to 12 doses., Disp: 12 tablet, Rfl: 0  •  potassium chloride (KLOR-CON M) 20 mEq CR tablet, Take 1 tablet (20 mEq total) by mouth 2 (two) times a day., Disp: 180 tablet, Rfl: 1  •  rosuvastatin (CRESTOR) 20 mg tablet, TAKE 1 TABLET(20 MG) BY MOUTH DAILY, Disp: 90 tablet, Rfl: 1  •  torsemide (DEMADEX) 20 mg tablet, Take 1 tablet (20 mg total) by mouth daily., Disp: 90 tablet, Rfl: 1  •  valACYclovir (VALTREX) 500 mg tablet, Take 500 mg by mouth daily., Disp: , Rfl:       BP Readings from Last 3 Encounters:   06/16/22 (!) 157/83   05/12/22 137/85   03/22/22 138/80       Recent Lab results:  Lab Results   Component Value Date    CHOL 102 02/15/2022   ,   Lab Results   Component Value Date    HDL 46 02/15/2022   ,   Lab Results   Component Value Date    LDLCALC 42 02/15/2022   ,   Lab Results   Component Value Date    TRIG 65 02/15/2022        Lab Results   Component Value Date    GLUCOSE 94 02/15/2022   , No results found for: HGBA1C      Lab  Results   Component Value Date    CREATININE 0.88 02/15/2022       Lab Results   Component Value Date    TSH 2.330 06/22/2021

## 2022-07-18 NOTE — ED ATTESTATION NOTE
The patient was evaluated and managed by the physician assistant / nurse practitioner.    I agree with the evaluation, treatment, and plan of care provided by the physician assistant/nurse practitioner.  I also agree with the documentation provided.      Pulse ox was: 98% on room air, normal     Julius Harvey,   07/18/22 1236

## 2022-07-19 ENCOUNTER — TELEPHONE (OUTPATIENT)
Dept: SCHEDULING | Facility: CLINIC | Age: 57
End: 2022-07-19
Payer: OTHER GOVERNMENT

## 2022-07-19 RX ORDER — ROSUVASTATIN CALCIUM 20 MG/1
TABLET, COATED ORAL
Qty: 90 TABLET | Refills: 3 | Status: SHIPPED | OUTPATIENT
Start: 2022-07-19 | End: 2022-07-19 | Stop reason: SDUPTHER

## 2022-07-19 RX ORDER — ROSUVASTATIN CALCIUM 20 MG/1
TABLET, COATED ORAL
Qty: 90 TABLET | Refills: 3 | Status: SHIPPED | OUTPATIENT
Start: 2022-07-19

## 2022-07-19 NOTE — TELEPHONE ENCOUNTER
Medicine Refill Request    Last Office: Visit date not found   Last Consult Visit: Visit date not found  Last Telemedicine Visit: Visit date not found    Pt called  Req. Refill for rosuvastatin 20MG send to [facility] pharmacy     Next Appointment: Visit date not found      Current Outpatient Medications:   •  albuterol HFA (VENTOLIN HFA) 90 mcg/actuation inhaler, , Disp: , Rfl:   •  brompheniramine-pseudoeph-DM 2-30-10 mg/5 mL syrup, , Disp: , Rfl:   •  hydrochlorothiazide (HYDRODIURIL) 25 mg tablet, Take 1 tablet (25 mg total) by mouth daily., Disp: 90 tablet, Rfl: 1  •  levocetirizine (XYZAL) 5 mg tablet, Take by mouth., Disp: , Rfl:   •  montelukast (SINGULAIR) 10 mg tablet, Take 10 mg by mouth daily., Disp: , Rfl:   •  oxyCODONE-acetaminophen (PERCOCET) 5-325 mg per tablet, Take 1 tablet by mouth every 6 (six) hours as needed for severe pain for up to 12 doses., Disp: 12 tablet, Rfl: 0  •  potassium chloride (KLOR-CON M) 20 mEq CR tablet, Take 1 tablet (20 mEq total) by mouth 2 (two) times a day., Disp: 180 tablet, Rfl: 1  •  rosuvastatin (CRESTOR) 20 mg tablet, TAKE 1 TABLET(20 MG) BY MOUTH DAILY, Disp: 90 tablet, Rfl: 1  •  torsemide (DEMADEX) 20 mg tablet, Take 1 tablet (20 mg total) by mouth daily., Disp: 90 tablet, Rfl: 1  •  valACYclovir (VALTREX) 500 mg tablet, Take 500 mg by mouth daily., Disp: , Rfl:       BP Readings from Last 3 Encounters:   06/16/22 (!) 157/83   05/12/22 137/85   03/22/22 138/80       Recent Lab results:  Lab Results   Component Value Date    CHOL 102 02/15/2022   ,   Lab Results   Component Value Date    HDL 46 02/15/2022   ,   Lab Results   Component Value Date    LDLCALC 42 02/15/2022   ,   Lab Results   Component Value Date    TRIG 65 02/15/2022        Lab Results   Component Value Date    GLUCOSE 94 02/15/2022   , No results found for: HGBA1C      Lab Results   Component Value Date    CREATININE 0.88 02/15/2022       Lab Results   Component Value Date    TSH 2.330 06/22/2021

## 2022-07-19 NOTE — TELEPHONE ENCOUNTER
Pharmacy reports they have it but it is too soon to fill    Called PT she requested it be sent to [facility] where she is going and they can fill on 7/21    escribed to [facility]

## 2022-07-19 NOTE — TELEPHONE ENCOUNTER
Pt states [facility] told her that their electronic system is not working, and needs nurse to call in verbal for Crestor 20 mg tablet    [facility] pharmacy #[id number]    Pt states she will be leaving out of town tonight, and needs med before she leaves    [phone]

## 2022-09-01 ENCOUNTER — APPOINTMENT (EMERGENCY)
Dept: RADIOLOGY | Facility: HOSPITAL | Age: 57
End: 2022-09-01
Attending: EMERGENCY MEDICINE
Payer: OTHER GOVERNMENT

## 2022-09-01 ENCOUNTER — HOSPITAL ENCOUNTER (EMERGENCY)
Facility: HOSPITAL | Age: 57
Discharge: HOME | End: 2022-09-01
Attending: EMERGENCY MEDICINE
Payer: OTHER GOVERNMENT

## 2022-09-01 VITALS
DIASTOLIC BLOOD PRESSURE: 77 MMHG | SYSTOLIC BLOOD PRESSURE: 145 MMHG | HEART RATE: 67 BPM | OXYGEN SATURATION: 99 % | RESPIRATION RATE: 18 BRPM | TEMPERATURE: 97.8 F

## 2022-09-01 DIAGNOSIS — L03.032 CELLULITIS OF TOE OF LEFT FOOT: Primary | ICD-10-CM

## 2022-09-01 LAB
ALBUMIN SERPL-MCNC: 4.4 G/DL (ref 3.4–5)
ALP SERPL-CCNC: 77 IU/L (ref 35–126)
ALT SERPL-CCNC: 22 IU/L (ref 11–54)
ANION GAP SERPL CALC-SCNC: 5 MEQ/L (ref 3–15)
AST SERPL-CCNC: 19 IU/L (ref 15–41)
BASOPHILS # BLD: 0.05 K/UL (ref 0.01–0.1)
BASOPHILS NFR BLD: 0.7 %
BILIRUB SERPL-MCNC: 0.5 MG/DL (ref 0.3–1.2)
BUN SERPL-MCNC: 16 MG/DL (ref 8–20)
CALCIUM SERPL-MCNC: 9.3 MG/DL (ref 8.9–10.3)
CHLORIDE SERPL-SCNC: 104 MEQ/L (ref 98–109)
CO2 SERPL-SCNC: 29 MEQ/L (ref 22–32)
CREAT SERPL-MCNC: 0.8 MG/DL (ref 0.6–1.1)
DIFFERENTIAL METHOD BLD: NORMAL
EOSINOPHIL # BLD: 0.19 K/UL (ref 0.04–0.36)
EOSINOPHIL NFR BLD: 2.8 %
ERYTHROCYTE [DISTWIDTH] IN BLOOD BY AUTOMATED COUNT: 12.5 % (ref 11.7–14.4)
GFR SERPL CREATININE-BSD FRML MDRD: >60 ML/MIN/1.73M*2
GLUCOSE SERPL-MCNC: 103 MG/DL (ref 70–99)
HCT VFR BLDCO AUTO: 40.6 % (ref 35–45)
HGB BLD-MCNC: 13.6 G/DL (ref 11.8–15.7)
IMM GRANULOCYTES # BLD AUTO: 0.02 K/UL (ref 0–0.08)
IMM GRANULOCYTES NFR BLD AUTO: 0.3 %
LYMPHOCYTES # BLD: 1.41 K/UL (ref 1.2–3.5)
LYMPHOCYTES NFR BLD: 20.4 %
MCH RBC QN AUTO: 32 PG (ref 28–33.2)
MCHC RBC AUTO-ENTMCNC: 33.5 G/DL (ref 32.2–35.5)
MCV RBC AUTO: 95.5 FL (ref 83–98)
MONOCYTES # BLD: 0.43 K/UL (ref 0.28–0.8)
MONOCYTES NFR BLD: 6.2 %
NEUTROPHILS # BLD: 4.8 K/UL (ref 1.7–7)
NEUTS SEG NFR BLD: 69.6 %
NRBC BLD-RTO: 0 %
PDW BLD AUTO: 10.2 FL (ref 9.4–12.3)
PLATELET # BLD AUTO: 259 K/UL (ref 150–369)
POTASSIUM SERPL-SCNC: 3.9 MEQ/L (ref 3.6–5.1)
PROT SERPL-MCNC: 6.3 G/DL (ref 6–8.2)
RBC # BLD AUTO: 4.25 M/UL (ref 3.93–5.22)
SODIUM SERPL-SCNC: 138 MEQ/L (ref 136–144)
WBC # BLD AUTO: 6.9 K/UL (ref 3.8–10.5)

## 2022-09-01 PROCEDURE — 80053 COMPREHEN METABOLIC PANEL: CPT | Performed by: PHYSICIAN ASSISTANT

## 2022-09-01 PROCEDURE — 73660 X-RAY EXAM OF TOE(S): CPT | Mod: LT

## 2022-09-01 PROCEDURE — 36415 COLL VENOUS BLD VENIPUNCTURE: CPT | Performed by: PHYSICIAN ASSISTANT

## 2022-09-01 PROCEDURE — 99284 EMERGENCY DEPT VISIT MOD MDM: CPT | Mod: 25

## 2022-09-01 PROCEDURE — 85025 COMPLETE CBC W/AUTO DIFF WBC: CPT | Performed by: PHYSICIAN ASSISTANT

## 2022-09-01 PROCEDURE — 63700000 HC SELF-ADMINISTRABLE DRUG: Performed by: PHYSICIAN ASSISTANT

## 2022-09-01 PROCEDURE — 93971 EXTREMITY STUDY: CPT | Mod: LT

## 2022-09-01 RX ORDER — DOXYCYCLINE HYCLATE 100 MG
100 TABLET ORAL ONCE
Status: COMPLETED | OUTPATIENT
Start: 2022-09-01 | End: 2022-09-01

## 2022-09-01 RX ORDER — DOXYCYCLINE 100 MG/1
100 CAPSULE ORAL 2 TIMES DAILY
Qty: 14 CAPSULE | Refills: 0 | Status: SHIPPED | OUTPATIENT
Start: 2022-09-01 | End: 2022-09-08

## 2022-09-01 RX ADMIN — DOXYCYCLINE HYCLATE 100 MG: 100 TABLET, COATED ORAL at 17:10

## 2022-09-01 ASSESSMENT — ENCOUNTER SYMPTOMS
NUMBNESS: 0
WOUND: 1
CHILLS: 0
COLOR CHANGE: 1
WEAKNESS: 0
SHORTNESS OF BREATH: 0
FEVER: 0

## 2022-09-01 NOTE — ED PROVIDER NOTES
Emergency Medicine Note  HPI   HISTORY OF PRESENT ILLNESS     55 y/o female with PMH allergies, COVID, GERD, HTN, HLD, lymphedema left leg presents with swelling left ankle. Pt fell on 5/12 causing a left fibular fracture. Pt followed with podiatry. Was getting xrays every 2 weeks. Six weeks after the fall it was found pt also had a tibia fracture. Pt has been in a boot for 3 months. Pt states her leg has been swollen for the past couple months. Pt has been out of the boot for 3 weeks and going to PT. Pt has been at the beach in [place] for a week, getting home last night. During this trip pt noticed a wound lateral to her big toe nail. IT opened up, draining pus and blood. Pt Used hydrogen peroxide, topical alcohol, neosporin and topical clindamycin. Pt had doxycycline, so she took 100 mg daily for 3 days. Pt believes the redness and swelling has improved some. However she also got sunburn on her leg. Pt states she had some pain to the lateral toenail but motrin helps. Denies fever, chills, CP, SOB, calf pain, numbness, tingling or weakness.             Patient History   PAST HISTORY     Reviewed from Nursing Triage:         Past Medical History:   Diagnosis Date    Allergies     COVID-19     GERD (gastroesophageal reflux disease)     Hypertension     Lipid disorder        Past Surgical History:   Procedure Laterality Date    TONSILLECTOMY      WISDOM TOOTH EXTRACTION         Family History   Problem Relation Age of Onset    Lymphoma Biological Mother     Heart attack Biological Father     Aortic aneurysm Biological Father     Hypertension Biological Father     Stroke Maternal Grandfather     Angina Paternal Grandmother     Heart attack Paternal Grandmother     Stroke Paternal Grandfather        Social History     Tobacco Use    Smoking status: Never Smoker    Smokeless tobacco: Never Used   Vaping Use    Vaping Use: Never used   Substance Use Topics    Alcohol use: Yes     Comment: rare     Drug use: Never         Review of Systems   REVIEW OF SYSTEMS     Review of Systems   Constitutional: Negative for chills and fever.   Respiratory: Negative for shortness of breath.    Cardiovascular: Positive for leg swelling. Negative for chest pain.   Skin: Positive for color change and wound.   Neurological: Negative for weakness and numbness.         VITALS     ED Vitals    Date/Time Temp Pulse Resp BP SpO2 [facility]   09/01/22 1714 -- 67 18 145/77 99 % JT   09/01/22 1420 36.6 °C (97.8 °F) 90 17 156/80 96 % HC                       Physical Exam   PHYSICAL EXAM     Physical Exam  Vitals and nursing note reviewed.   Constitutional:       General: She is not in acute distress.  HENT:      Head: Normocephalic.   Cardiovascular:      Rate and Rhythm: Normal rate.   Pulmonary:      Effort: Pulmonary effort is normal.   Musculoskeletal:      Left lower leg: Edema present.      Comments: Left calf/foot is swollen compared to right. Not pitting. No TTP to calf or foot. Erythema to anterior calf (?sunburn). Tenderness just lateral to toe nial. Wound to area. No drainage. Minimal erythema of big toe. Distal sensation equal to other toes. Palpable DP and PT pulses    Skin:     Capillary Refill: Capillary refill takes less than 2 seconds.      Findings: Erythema present.   Neurological:      General: No focal deficit present.      Mental Status: She is alert.   Psychiatric:         Behavior: Behavior normal.           PROCEDURES     Procedures     DATA     Results     Procedure Component Value Units Date/Time    Comprehensive metabolic panel [[id number]]  (Abnormal) Collected: 09/01/22 1536    Specimen: Blood, Venous Updated: 09/01/22 1614     Sodium 138 mEQ/L      Potassium 3.9 mEQ/L      Comment: Results obtained on plasma. Plasma Potassium values may be up to 0.4 mEQ/L less than serum values. The differences may be greater for patients with high platelet or white cell counts.        Chloride 104 mEQ/L      CO2 29 mEQ/L       BUN 16 mg/dL      Creatinine 0.8 mg/dL      Glucose 103 mg/dL      Calcium 9.3 mg/dL      AST (SGOT) 19 IU/L      ALT (SGPT) 22 IU/L      Alkaline Phosphatase 77 IU/L      Total Protein 6.3 g/dL      Comment: Test performed on plasma which typically contains approximately 0.4 g/dL more protein than serum.        Albumin 4.4 g/dL      Bilirubin, Total 0.5 mg/dL      eGFR >60.0 mL/min/1.73m*2      Anion Gap 5 mEQ/L     CBC and differential [[id number]] Collected: 09/01/22 1536    Specimen: Blood, Venous Updated: 09/01/22 1545     WBC 6.90 K/uL      RBC 4.25 M/uL      Hemoglobin 13.6 g/dL      Hematocrit 40.6 %      MCV 95.5 fL      MCH 32.0 pg      MCHC 33.5 g/dL      RDW 12.5 %      Platelets 259 K/uL      MPV 10.2 fL      Differential Type Auto     nRBC 0.0 %      Immature Granulocytes 0.3 %      Neutrophils 69.6 %      Lymphocytes 20.4 %      Monocytes 6.2 %      Eosinophils 2.8 %      Basophils 0.7 %      Immature Granulocytes, Absolute 0.02 K/uL      Neutrophils, Absolute 4.80 K/uL      Lymphocytes, Absolute 1.41 K/uL      Monocytes, Absolute 0.43 K/uL      Eosinophils, Absolute 0.19 K/uL      Basophils, Absolute 0.05 K/uL           Imaging Results          US venous leg, LL extremity (Final result)  Result time 09/01/22 16:38:53    Final result                 Impression:    IMPRESSION:  No deep venous thrombosis in the left lower extremity.  Mild subcutaneous edema  in the calf.             Narrative:    CLINICAL HISTORY: Swelling.  Left ankle fracture.  Sore on big toe.    COMMENT:    Comparison: 6/16/2022    Technique: Grayscale ultrasound and color and spectral Doppler of the left lower  extremity was performed.    Findings:  LEFT: There is normal compressibility and color Doppler of the common femoral,  femoral, popliteal, and portions of the calf veins. There is normal spontaneous  and phasic variation throughout the lower extremity by spectral Doppler.  There  is mild subcutaneous edema in the  calf.    RIGHT: Limited imaging of the common femoral vein shows compressibility with  normal color Doppler and spectral waveform.                               X-RAY TOE LEFT 2+ VIEWS (Final result)  Result time 09/01/22 16:08:33    Final result                 Impression:    IMPRESSION:  No radiographic evidence of osteomyelitis. If there is persistent  clinical suspicion, correlation with MRI could be performed.    COMMENT:  4 views of the left first toe demonstrate osteoporosis. There is no  acute bony abnormality or erosion. No subcutaneous emphysema. Mild first MTP  degenerative change               Narrative:      CLINICAL HISTORY:  First distal phalangeal infection.                                No orders to display       Scoring tools                                  ED Course & MDM   MDM / ED COURSE / CLINICAL IMPRESSION / DISPO     MDM    ED Course as of 09/01/22 1720   Thu Sep 01, 2022   1528 Impression: R leg swelling/red  Possible big toe infxn   Recent travel to [place]    Plan: labs, xray, us, reeval  Discussed pt and plan with attending who agrees  [DB]   1611 X-RAY TOE LEFT 2+ VIEWS  IMPRESSION:  No radiographic evidence of osteomyelitis. If there is persistent  clinical suspicion, correlation with MRI could be performed [DB]   1616 Labs unremarkable. Pt at US  [DB]   1657 US venous leg, LL extremity  --  IMPRESSION:  No deep venous thrombosis in the left lower extremity.  Mild subcutaneous edema  in the calf. [DB]      ED Course User Index  [DB] Ольга Atkins PA C     Clinical Impression      Cellulitis of toe of left foot     Disposition           Ольга Atkins PA C  09/01/22 1720

## 2022-09-01 NOTE — DISCHARGE INSTRUCTIONS
Take 100 mg doxycycline twice a day for 7 days  Warm soaks to the left toe  Motrin or tylenol every 6 hours as needed for pain   Follow up with podiatry for further evaluation  Return to the ED at any time for worsening symptoms.

## 2022-09-05 NOTE — ED ATTESTATION NOTE
I have personally seen and examined the patient.  I reviewed and agree with physician assistant / nurse practitioners assessment and plan of care, with the following exceptions: None  My examination, assessment, and plan of care of Preeti Hyde is as follows:        56-year-old female presents for evaluation left leg erythema and swelling is lateral is a wound to her left big toenail.  Patient reports she opened it up and drained pus.  Patient has been soaking with peroxide, alcohol using Neosporin and topical clindamycin.  Patient took leftover doxycycline for the past 3 days.  On exam patient is awake alert in no acute distress.  Patient has no active paronychia to her left great toenail.  She has erythema and warmth to her left foot and leg consistent with cellulitis.  Patient is afebrile.  Her white count is not elevated.  Ultrasound was obtained which shows no DVT.  Patient was started on oral doxycycline and was discharged.  Strict return precautions were given.       Raissa Horton MD  09/05/22 8485

## 2022-10-06 ENCOUNTER — TELEPHONE (OUTPATIENT)
Dept: INTERNAL MEDICINE | Facility: CLINIC | Age: 57
End: 2022-10-06
Payer: OTHER GOVERNMENT

## 2022-10-06 RX ORDER — NIRMATRELVIR AND RITONAVIR 300-100 MG
3 KIT ORAL 2 TIMES DAILY
Qty: 30 TABLET | Refills: 0 | Status: SHIPPED | OUTPATIENT
Start: 2022-10-06 | End: 2022-10-11

## 2022-10-06 NOTE — TELEPHONE ENCOUNTER
Patient called stated that she went to urgent care last night and tested positive for COVID.  The patient stated that the urgent care does not prescribe Paxlovid and told her to contact her PCP.  I told the patient we will need to see the information along with the positive COVID test result and I contacted [facility] in [place] at [phone] and they will fax over her information.  I told the patient that as soon as we receive her information, our nurse will call to provide her guidance and see if Paxlovid is appropriate for her.

## 2022-10-06 NOTE — TELEPHONE ENCOUNTER
We received a record from urgent care. Spoke with patient about her symptoms. Pt stated she has been sick since end of September, but was negative for COVID at that time. Pt started fever, chills, cough and congestion since Tuesday and not feeling any better.  Pt has been taking Dayquil and Nyquil. Pt ended up went to urgent care and got positive for COVID.  Reviewed her chart. Given multiple symptoms of COVID, Paxlovid was recommended. Side effects explained and pt already was aware of possible Paxlovid rebound. Pt is starting new job in the middle of October, so would like to be feeling better soon and requested Paxlovid. Paxlovid sent in.

## 2022-10-07 RX ORDER — CODEINE PHOSPHATE AND GUAIFENESIN 10; 100 MG/5ML; MG/5ML
5 SOLUTION ORAL EVERY 6 HOURS PRN
Qty: 473 ML | Refills: 0 | Status: SHIPPED | OUTPATIENT
Start: 2022-10-07 | End: 2022-10-08 | Stop reason: SDUPTHER

## 2022-10-07 NOTE — TELEPHONE ENCOUNTER
Please call pt [phone]  Pt started paxlovid but has a horrible cough ;  Requesting cough medicine  Has been using albuterol; bromfed; benzonatate that was give to her at the urgent care but these medications are not working

## 2022-10-07 NOTE — TELEPHONE ENCOUNTER
LVM for patient that Dr. Lechuga sent in Quafenisin with Codeine to her pharmacy for her cough.  I also informed patient in voice mail that she should stop brompheneriamine and tessalon perles.  I told the patient to call us back next week if she is still not feeling better.

## 2022-10-07 NOTE — TELEPHONE ENCOUNTER
Spoke with patient about cough. Pt stated she has never had cough like this before. When she cough, it hurts there throat and back.  Inhaler and Tessalon perle not working.  I told her to give it a bit of time to start Paxlovid working since she only had 2 doses so far.  Encourage to keep hydrating. I told her that she may try some Mucinex DM for cough and congestion.  Pt wondering she can have something prescribed cough medication.

## 2022-10-08 ENCOUNTER — NURSE TRIAGE (OUTPATIENT)
Dept: INTERNAL MEDICINE | Facility: CLINIC | Age: 57
End: 2022-10-08
Payer: OTHER GOVERNMENT

## 2022-10-08 RX ORDER — CODEINE PHOSPHATE AND GUAIFENESIN 10; 100 MG/5ML; MG/5ML
5 SOLUTION ORAL EVERY 6 HOURS PRN
Qty: 100 ML | Refills: 0 | Status: SHIPPED | OUTPATIENT
Start: 2022-10-08 | End: 2022-10-18

## 2022-10-08 RX ORDER — CODEINE PHOSPHATE AND GUAIFENESIN 10; 100 MG/5ML; MG/5ML
5 SOLUTION ORAL EVERY 6 HOURS PRN
Qty: 240 ML | Refills: 0 | Status: SHIPPED | OUTPATIENT
Start: 2022-10-08 | End: 2022-10-08 | Stop reason: SDUPTHER

## 2022-10-08 NOTE — TELEPHONE ENCOUNTER
"co    Answer Assessment - Initial Assessment Questions  1. ONSET: \"When did the cough begin?\"       4 days ago  2. SEVERITY: \"How bad is the cough today?\"       same  3. RESPIRATORY DISTRESS: \"Describe your breathing.\"       no  4. FEVER: \"Do you have a fever?\" If so, ask: \"What is your temperature, how was it measured, and when did it start?\"      no  5. SPUTUM: \"Describe the color of your sputum\" (clear, white, yellow, green)      Yes,clear  6. HEMOPTYSIS: \"Are you coughing up any blood?\" If so ask: \"How much?\" (flecks, streaks, tablespoons, etc.)      no  7. CARDIAC HISTORY: \"Do you have any history of heart disease?\" (e.g., heart attack, congestive heart failure)       no  8. LUNG HISTORY: \"Do you have any history of lung disease?\"  (e.g., pulmonary embolus, asthma, emphysema)      no  9. PE RISK FACTORS: \"Do you have a history of blood clots?\" (or: recent major surgery, recent prolonged travel, bedridden )      no  10. OTHER SYMPTOMS: \"Do you have any other symptoms?\" (e.g., runny nose, wheezing, chest pain)        no  11. PREGNANCY: \"Is there any chance you are pregnant?\" \"When was your last menstrual period?\"        N/A  12. TRAVEL: \"Have you traveled out of the country in the last month?\" (e.g., travel history, exposures)        no    Protocols used: COUGH - ACUTE PRODUCTIVE-ADULT-AH  Patient would like [facility]. [facility]  "

## 2022-10-08 NOTE — TELEPHONE ENCOUNTER
Call reviewed from earlier, pt needs cough medication sent to [facility] in [facility].  Rx sent to pharmacy where pt is currently

## 2022-10-13 ENCOUNTER — TELEPHONE (OUTPATIENT)
Dept: INTERNAL MEDICINE | Facility: CLINIC | Age: 57
End: 2022-10-13

## 2022-10-13 RX ORDER — BROMPHENIRAMINE MALEATE, PSEUDOEPHEDRINE HYDROCHLORIDE, AND DEXTROMETHORPHAN HYDROBROMIDE 2; 30; 10 MG/5ML; MG/5ML; MG/5ML
2.5 SYRUP ORAL 4 TIMES DAILY PRN
Qty: 120 ML | Refills: 0 | Status: SHIPPED | OUTPATIENT
Start: 2022-10-13 | End: 2022-10-23

## 2022-10-13 NOTE — TELEPHONE ENCOUNTER
Pt called stating that she continues to have congestion and cough.    Pt had COVID + last Wednesday and completed Paxlovid. Because of severe cough, she got Robitussin w/Codeine over the weekend by on call nurse practitioner.  The medications seems not helping much, but Bromphen(Brompheniramine-Psedoeph-DM) has been working great.  Bromphen was prescribed at urgent care before and the bottle is almost empty.    Pt asking that Dr. Lechuga can prescribe [facility].

## 2022-11-22 ENCOUNTER — TELEPHONE (OUTPATIENT)
Dept: SCHEDULING | Facility: CLINIC | Age: 57
End: 2022-11-22
Payer: OTHER GOVERNMENT

## 2022-11-22 NOTE — TELEPHONE ENCOUNTER
Medical Records Request     Name of caller: Preeti    Relationship to patient: self    Whos requesting copy of medical records? Pt-mail to pts home    Records being requested: any cardiac records    Best contact number: [phone]

## 2022-11-22 NOTE — TELEPHONE ENCOUNTER
Called and spoke with Preeti.  She is moving to [facility].  She wanted to refresh herself with the results.  Reviewed results of CTA with her.  She understands.  No questions.

## 2022-11-22 NOTE — TELEPHONE ENCOUNTER
I left message on her tape machine and said if she was referring to her chest from CTA from 2021 that she had minimal plaque and that is why she is on the rosuvastatin

## 2022-11-22 NOTE — TELEPHONE ENCOUNTER
Patient Name: Preeti Hyde    Caller name: Preeti    Relationship: self    Reason for call: Pt has questions regarding CTA results and how much blockage was observed.    Callback number: [phone]    Please l/m with detailed info

## 2024-01-24 ENCOUNTER — APPOINTMENT (OUTPATIENT)
Dept: URBAN - NONMETROPOLITAN AREA CLINIC 40 | Age: 59
Setting detail: DERMATOLOGY
End: 2024-01-25

## 2024-01-24 DIAGNOSIS — I87.2 VENOUS INSUFFICIENCY (CHRONIC) (PERIPHERAL): ICD-10-CM

## 2024-01-24 DIAGNOSIS — D22 MELANOCYTIC NEVI: ICD-10-CM

## 2024-01-24 DIAGNOSIS — L81.4 OTHER MELANIN HYPERPIGMENTATION: ICD-10-CM

## 2024-01-24 DIAGNOSIS — L70.0 ACNE VULGARIS: ICD-10-CM

## 2024-01-24 PROBLEM — D22.71 MELANOCYTIC NEVI OF RIGHT LOWER LIMB, INCLUDING HIP: Status: ACTIVE | Noted: 2024-01-24

## 2024-01-24 PROCEDURE — OTHER COUNSELING: OTHER

## 2024-01-24 PROCEDURE — 99204 OFFICE O/P NEW MOD 45 MIN: CPT

## 2024-01-24 PROCEDURE — OTHER ADDITIONAL NOTES: OTHER

## 2024-01-24 PROCEDURE — OTHER PRESCRIPTION: OTHER

## 2024-01-24 PROCEDURE — OTHER OTC TREATMENT REGIMEN: OTHER

## 2024-01-24 RX ORDER — CLINDAMYCIN PHOSPHATE 10 MG/ML
LOTION TOPICAL
Qty: 120 | Refills: 3 | Status: ERX

## 2024-01-24 RX ORDER — SULFACETAMIDE SODIUM 100 MG/ML
LOTION TOPICAL
Qty: 118 | Refills: 3 | Status: ERX | COMMUNITY
Start: 2024-01-24

## 2024-01-24 RX ORDER — TRIAMCINOLONE ACETONIDE 1 MG/G
CREAM TOPICAL
Qty: 453.6 | Refills: 3 | Status: ERX | COMMUNITY
Start: 2024-01-24

## 2024-01-24 ASSESSMENT — LOCATION SIMPLE DESCRIPTION DERM
LOCATION SIMPLE: LEFT PRETIBIAL REGION
LOCATION SIMPLE: RIGHT PRETIBIAL REGION
LOCATION SIMPLE: LEFT UPPER BACK
LOCATION SIMPLE: RIGHT 3RD TOE
LOCATION SIMPLE: RIGHT UPPER BACK
LOCATION SIMPLE: RIGHT CHEEK
LOCATION SIMPLE: LEFT CHEEK

## 2024-01-24 ASSESSMENT — LOCATION DETAILED DESCRIPTION DERM
LOCATION DETAILED: LEFT SUPERIOR UPPER BACK
LOCATION DETAILED: RIGHT DORSAL 3RD TOE
LOCATION DETAILED: LEFT DISTAL PRETIBIAL REGION
LOCATION DETAILED: RIGHT SUPERIOR UPPER BACK
LOCATION DETAILED: RIGHT DISTAL PRETIBIAL REGION
LOCATION DETAILED: RIGHT INFERIOR CENTRAL MALAR CHEEK
LOCATION DETAILED: LEFT CENTRAL MALAR CHEEK

## 2024-01-24 ASSESSMENT — LOCATION ZONE DERM
LOCATION ZONE: TOE
LOCATION ZONE: TRUNK
LOCATION ZONE: FACE
LOCATION ZONE: LEG

## 2024-01-24 NOTE — PROCEDURE: COUNSELING
Detail Level: Detailed
Winlevi Counseling:  I discussed with the patient the risks of topical clascoterone including but not limited to erythema, scaling, itching, and stinging. Patient voiced their understanding.
Topical Retinoid Pregnancy And Lactation Text: This medication is Pregnancy Category C. It is unknown if this medication is excreted in breast milk.
Aklief counseling:  Patient advised to apply a pea-sized amount only at bedtime and wait 30 minutes after washing their face before applying.  If too drying, patient may add a non-comedogenic moisturizer.  The most commonly reported side effects including irritation, redness, scaling, dryness, stinging, burning, itching, and increased risk of sunburn.  The patient verbalized understanding of the proper use and possible adverse effects of retinoids.  All of the patient's questions and concerns were addressed.
Include Pregnancy/Lactation Warning?: No
Birth Control Pills Pregnancy And Lactation Text: This medication should be avoided if pregnant and for the first 30 days post-partum.
Isotretinoin Counseling: Patient should get monthly blood tests, not donate blood, not drive at night if vision affected, not share medication, and not undergo elective surgery for 6 months after tx completed. Side effects reviewed, pt to contact office should one occur.
Sarecycline Pregnancy And Lactation Text: This medication is Pregnancy Category D and not consider safe during pregnancy. It is also excreted in breast milk.
Tazorac Pregnancy And Lactation Text: This medication is not safe during pregnancy. It is unknown if this medication is excreted in breast milk.
Azelaic Acid Counseling: Patient counseled that medicine may cause skin irritation and to avoid applying near the eyes.  In the event of skin irritation, the patient was advised to reduce the amount of the drug applied or use it less frequently.   The patient verbalized understanding of the proper use and possible adverse effects of azelaic acid.  All of the patient's questions and concerns were addressed.
Bactrim Pregnancy And Lactation Text: This medication is Pregnancy Category D and is known to cause fetal risk.  It is also excreted in breast milk.
Erythromycin Counseling:  I discussed with the patient the risks of erythromycin including but not limited to GI upset, allergic reaction, drug rash, diarrhea, increase in liver enzymes, and yeast infections.
Azithromycin Pregnancy And Lactation Text: This medication is considered safe during pregnancy and is also secreted in breast milk.
Doxycycline Counseling:  Patient counseled regarding possible photosensitivity and increased risk for sunburn.  Patient instructed to avoid sunlight, if possible.  When exposed to sunlight, patients should wear protective clothing, sunglasses, and sunscreen.  The patient was instructed to call the office immediately if the following severe adverse effects occur:  hearing changes, easy bruising/bleeding, severe headache, or vision changes.  The patient verbalized understanding of the proper use and possible adverse effects of doxycycline.  All of the patient's questions and concerns were addressed.
High Dose Vitamin A Pregnancy And Lactation Text: High dose vitamin A therapy is contraindicated during pregnancy and breast feeding.
Tetracycline Counseling: Patient counseled regarding possible photosensitivity and increased risk for sunburn.  Patient instructed to avoid sunlight, if possible.  When exposed to sunlight, patients should wear protective clothing, sunglasses, and sunscreen.  The patient was instructed to call the office immediately if the following severe adverse effects occur:  hearing changes, easy bruising/bleeding, severe headache, or vision changes.  The patient verbalized understanding of the proper use and possible adverse effects of tetracycline.  All of the patient's questions and concerns were addressed. Patient understands to avoid pregnancy while on therapy due to potential birth defects.
Azelaic Acid Pregnancy And Lactation Text: This medication is considered safe during pregnancy and breast feeding.
Topical Clindamycin Counseling: Patient counseled that this medication may cause skin irritation or allergic reactions.  In the event of skin irritation, the patient was advised to reduce the amount of the drug applied or use it less frequently.   The patient verbalized understanding of the proper use and possible adverse effects of clindamycin.  All of the patient's questions and concerns were addressed.
Dapsone Counseling: I discussed with the patient the risks of dapsone including but not limited to hemolytic anemia, agranulocytosis, rashes, methemoglobinemia, kidney failure, peripheral neuropathy, headaches, GI upset, and liver toxicity.  Patients who start dapsone require monitoring including baseline LFTs and weekly CBCs for the first month, then every month thereafter.  The patient verbalized understanding of the proper use and possible adverse effects of dapsone.  All of the patient's questions and concerns were addressed.
Isotretinoin Pregnancy And Lactation Text: This medication is Pregnancy Category X and is considered extremely dangerous during pregnancy. It is unknown if it is excreted in breast milk.
Spironolactone Counseling: Patient advised regarding risks of diarrhea, abdominal pain, hyperkalemia, birth defects (for female patients), liver toxicity and renal toxicity. The patient may need blood work to monitor liver and kidney function and potassium levels while on therapy. The patient verbalized understanding of the proper use and possible adverse effects of spironolactone.  All of the patient's questions and concerns were addressed.
Topical Sulfur Applications Counseling: Topical Sulfur Counseling: Patient counseled that this medication may cause skin irritation or allergic reactions.  In the event of skin irritation, the patient was advised to reduce the amount of the drug applied or use it less frequently.   The patient verbalized understanding of the proper use and possible adverse effects of topical sulfur application.  All of the patient's questions and concerns were addressed.
Benzoyl Peroxide Pregnancy And Lactation Text: This medication is Pregnancy Category C. It is unknown if benzoyl peroxide is excreted in breast milk.
Birth Control Pills Counseling: Birth Control Pill Counseling: I discussed with the patient the potential side effects of OCPs including but not limited to increased risk of stroke, heart attack, thrombophlebitis, deep venous thrombosis, hepatic adenomas, breast changes, GI upset, headaches, and depression.  The patient verbalized understanding of the proper use and possible adverse effects of OCPs. All of the patient's questions and concerns were addressed.
Erythromycin Pregnancy And Lactation Text: This medication is Pregnancy Category B and is considered safe during pregnancy. It is also excreted in breast milk.
Sarecycline Counseling: Patient advised regarding possible photosensitivity and discoloration of the teeth, skin, lips, tongue and gums.  Patient instructed to avoid sunlight, if possible.  When exposed to sunlight, patients should wear protective clothing, sunglasses, and sunscreen.  The patient was instructed to call the office immediately if the following severe adverse effects occur:  hearing changes, easy bruising/bleeding, severe headache, or vision changes.  The patient verbalized understanding of the proper use and possible adverse effects of sarecycline.  All of the patient's questions and concerns were addressed.
Winlevi Pregnancy And Lactation Text: This medication is considered safe during pregnancy and breastfeeding.
Tazorac Counseling:  Patient advised that medication is irritating and drying.  Patient may need to apply sparingly and wash off after an hour before eventually leaving it on overnight.  The patient verbalized understanding of the proper use and possible adverse effects of tazorac.  All of the patient's questions and concerns were addressed.
Aklief Pregnancy And Lactation Text: It is unknown if this medication is safe to use during pregnancy.  It is unknown if this medication is excreted in breast milk.  Breastfeeding women should use the topical cream on the smallest area of the skin for the shortest time needed while breastfeeding.  Do not apply to nipple and areola.
High Dose Vitamin A Counseling: Side effects reviewed, pt to contact office should one occur.
Bactrim Counseling:  I discussed with the patient the risks of sulfa antibiotics including but not limited to GI upset, allergic reaction, drug rash, diarrhea, dizziness, photosensitivity, and yeast infections.  Rarely, more serious reactions can occur including but not limited to aplastic anemia, agranulocytosis, methemoglobinemia, blood dyscrasias, liver or kidney failure, lung infiltrates or desquamative/blistering drug rashes.
Doxycycline Pregnancy And Lactation Text: This medication is Pregnancy Category D and not consider safe during pregnancy. It is also excreted in breast milk but is considered safe for shorter treatment courses.
Minocycline Counseling: Patient advised regarding possible photosensitivity and discoloration of the teeth, skin, lips, tongue and gums.  Patient instructed to avoid sunlight, if possible.  When exposed to sunlight, patients should wear protective clothing, sunglasses, and sunscreen.  The patient was instructed to call the office immediately if the following severe adverse effects occur:  hearing changes, easy bruising/bleeding, severe headache, or vision changes.  The patient verbalized understanding of the proper use and possible adverse effects of minocycline.  All of the patient's questions and concerns were addressed.
Topical Sulfur Applications Pregnancy And Lactation Text: This medication is Pregnancy Category C and has an unknown safety profile during pregnancy. It is unknown if this topical medication is excreted in breast milk.
Topical Retinoid counseling:  Patient advised to apply a pea-sized amount only at bedtime and wait 30 minutes after washing their face before applying.  If too drying, patient may add a non-comedogenic moisturizer. The patient verbalized understanding of the proper use and possible adverse effects of retinoids.  All of the patient's questions and concerns were addressed.
Azithromycin Counseling:  I discussed with the patient the risks of azithromycin including but not limited to GI upset, allergic reaction, drug rash, diarrhea, and yeast infections.
Dapsone Pregnancy And Lactation Text: This medication is Pregnancy Category C and is not considered safe during pregnancy or breast feeding.
Spironolactone Pregnancy And Lactation Text: This medication can cause feminization of the male fetus and should be avoided during pregnancy. The active metabolite is also found in breast milk.
Benzoyl Peroxide Counseling: Patient counseled that medicine may cause skin irritation and bleach clothing.  In the event of skin irritation, the patient was advised to reduce the amount of the drug applied or use it less frequently.   The patient verbalized understanding of the proper use and possible adverse effects of benzoyl peroxide.  All of the patient's questions and concerns were addressed.
Topical Clindamycin Pregnancy And Lactation Text: This medication is Pregnancy Category B and is considered safe during pregnancy. It is unknown if it is excreted in breast milk.
Prescription Strength Graduated Compression Stockings Recommendations: The patient was counseled that prescription strength graduated compression stockings should be worn for all waking hours. They will follow up with a venous specialist to monitor graduated compression stocking usage and their symptoms.
Detail Level: Generalized

## 2024-02-15 ENCOUNTER — RX ONLY (RX ONLY)
Age: 59
End: 2024-02-15

## 2024-02-15 RX ORDER — SULFACETAMIDE SODIUM AND SULFUR 10; 5 MG/G; MG/G
RINSE TOPICAL
Qty: 340.6 | Refills: 1 | Status: ERX

## 2024-04-04 ENCOUNTER — APPOINTMENT (OUTPATIENT)
Dept: URBAN - NONMETROPOLITAN AREA CLINIC 40 | Age: 59
Setting detail: DERMATOLOGY
End: 2024-04-04

## 2024-04-04 ENCOUNTER — RX ONLY (RX ONLY)
Age: 59
End: 2024-04-04

## 2024-04-04 DIAGNOSIS — D17 BENIGN LIPOMATOUS NEOPLASM: ICD-10-CM

## 2024-04-04 PROBLEM — D17.1 BENIGN LIPOMATOUS NEOPLASM OF SKIN AND SUBCUTANEOUS TISSUE OF TRUNK: Status: ACTIVE | Noted: 2024-04-04

## 2024-04-04 PROCEDURE — 99212 OFFICE O/P EST SF 10 MIN: CPT

## 2024-04-04 PROCEDURE — OTHER OBSERVATION: OTHER

## 2024-04-04 PROCEDURE — OTHER REASSURANCE: OTHER

## 2024-04-04 PROCEDURE — OTHER OBSERVATION AND MEASURE: OTHER

## 2024-04-04 PROCEDURE — OTHER COUNSELING: OTHER

## 2024-04-04 RX ORDER — CLINDAMYCIN PHOSPHATE 10 MG/ML
LOTION TOPICAL
Qty: 240 | Refills: 3 | Status: ERX

## 2024-04-04 RX ORDER — SULFACETAMIDE SODIUM AND SULFUR 10; 5 MG/G; MG/G
RINSE TOPICAL
Qty: 681.2 | Refills: 1 | Status: ERX

## 2024-04-04 ASSESSMENT — LOCATION SIMPLE DESCRIPTION DERM: LOCATION SIMPLE: RIGHT UPPER BACK

## 2024-04-04 ASSESSMENT — LOCATION DETAILED DESCRIPTION DERM: LOCATION DETAILED: RIGHT SUPERIOR UPPER BACK

## 2024-04-04 ASSESSMENT — LOCATION ZONE DERM: LOCATION ZONE: TRUNK

## 2024-10-09 ENCOUNTER — APPOINTMENT (OUTPATIENT)
Dept: URBAN - NONMETROPOLITAN AREA CLINIC 40 | Age: 59
Setting detail: DERMATOLOGY
End: 2024-10-11

## 2024-10-09 DIAGNOSIS — I89.1 LYMPHANGITIS: ICD-10-CM

## 2024-10-09 DIAGNOSIS — D17 BENIGN LIPOMATOUS NEOPLASM: ICD-10-CM

## 2024-10-09 PROBLEM — D17.1 BENIGN LIPOMATOUS NEOPLASM OF SKIN AND SUBCUTANEOUS TISSUE OF TRUNK: Status: ACTIVE | Noted: 2024-10-09

## 2024-10-09 PROCEDURE — OTHER ADDITIONAL NOTES: OTHER

## 2024-10-09 PROCEDURE — OTHER ORDER TESTS: OTHER

## 2024-10-09 PROCEDURE — OTHER COUNSELING: OTHER

## 2024-10-09 PROCEDURE — OTHER OBSERVATION AND MEASURE: OTHER

## 2024-10-09 PROCEDURE — OTHER REASSURANCE: OTHER

## 2024-10-09 PROCEDURE — 99212 OFFICE O/P EST SF 10 MIN: CPT

## 2024-10-09 PROCEDURE — OTHER OBSERVATION: OTHER

## 2024-10-09 ASSESSMENT — LOCATION ZONE DERM: LOCATION ZONE: TRUNK

## 2024-10-09 ASSESSMENT — LOCATION SIMPLE DESCRIPTION DERM: LOCATION SIMPLE: RIGHT UPPER BACK

## 2024-10-09 ASSESSMENT — LOCATION DETAILED DESCRIPTION DERM: LOCATION DETAILED: RIGHT SUPERIOR UPPER BACK

## 2024-10-09 NOTE — PROCEDURE: ADDITIONAL NOTES
Detail Level: Simple
Additional Notes: Pt on PO antibiotic from [facility] for bronchitis.\\nNo streaking from non-tender lipoma on right upper back. Pt is concerned with lymphoma since there is family history (mother) of condition. Denies tenderness in cervical or mandible chain distributions.
Render Risk Assessment In Note?: no

## 2024-10-09 NOTE — PROCEDURE: ORDER TESTS
Billing Type: Third-Party Bill
Performing Laboratory: [id number]
Lab Facility: [id number]
Expected Date Of Service: 10/09/2024
Bill For Surgical Tray: no

## 2024-12-12 ENCOUNTER — HOSPITAL ENCOUNTER (EMERGENCY)
Facility: HOSPITAL | Age: 59
Discharge: HOME | End: 2024-12-12
Attending: EMERGENCY MEDICINE
Payer: OTHER GOVERNMENT

## 2024-12-12 VITALS
DIASTOLIC BLOOD PRESSURE: 105 MMHG | HEART RATE: 89 BPM | TEMPERATURE: 97.7 F | RESPIRATION RATE: 18 BRPM | OXYGEN SATURATION: 96 % | SYSTOLIC BLOOD PRESSURE: 160 MMHG

## 2024-12-12 DIAGNOSIS — N39.0 UTI (URINARY TRACT INFECTION): Primary | ICD-10-CM

## 2024-12-12 DIAGNOSIS — R35.0 URINARY FREQUENCY: ICD-10-CM

## 2024-12-12 LAB
BACTERIA UR QL AUTO: ABNORMAL /HPF
BILIRUB UR QL STRIP: ABNORMAL
CLARITY UR: CLEAR
COLOR UR: ABNORMAL
GLUCOSE UR STRIP-MCNC: ABNORMAL MG/DL
HGB UR QL STRIP.AUTO: ABNORMAL
KETONES UR STRIP-MCNC: ABNORMAL MG/DL
LEUKOCYTE ESTERASE UR QL STRIP: ABNORMAL
NITRITE UR QL STRIP: ABNORMAL
NON-SQ EPI CELLS URNS QL MICRO: ABNORMAL /HPF
PH UR STRIP.AUTO: ABNORMAL [PH]
PROT UR STRIP-MCNC: ABNORMAL MG/DL
RBC #/AREA URNS AUTO: ABNORMAL /HPF
SP GR UR STRIP.AUTO: 1.01 (ref 1–1.03)
UROBILINOGEN UR QL STRIP.AUTO: ABNORMAL E.U./DL
WBC #/AREA URNS AUTO: ABNORMAL /HPF

## 2024-12-12 PROCEDURE — 81001 URINALYSIS AUTO W/SCOPE: CPT

## 2024-12-12 PROCEDURE — 99284 EMERGENCY DEPT VISIT MOD MDM: CPT

## 2024-12-12 PROCEDURE — 99283 EMERGENCY DEPT VISIT LOW MDM: CPT

## 2024-12-12 RX ORDER — PHENAZOPYRIDINE HYDROCHLORIDE 200 MG/1
200 TABLET, FILM COATED ORAL 3 TIMES DAILY
Qty: 6 TABLET | Refills: 0 | Status: SHIPPED | OUTPATIENT
Start: 2024-12-12

## 2024-12-12 RX ORDER — NITROFURANTOIN 25; 75 MG/1; MG/1
100 CAPSULE ORAL 2 TIMES DAILY
Qty: 14 CAPSULE | Refills: 0 | Status: CANCELLED | OUTPATIENT
Start: 2024-12-12 | End: 2024-12-19

## 2024-12-12 RX ORDER — NITROFURANTOIN 25; 75 MG/1; MG/1
100 CAPSULE ORAL 2 TIMES DAILY
Qty: 14 CAPSULE | Refills: 0 | Status: SHIPPED | OUTPATIENT
Start: 2024-12-12 | End: 2024-12-19

## 2024-12-12 NOTE — ED ATTENDING ATTESTATION
12/12/2024  I, Fitz Bradley MD, saw and evaluated the patient. I have discussed the patient with the resident/non-physician practitioner and agree with the resident's/non-physician practitioner's findings, Plan of Care, and MDM as documented in the resident's/non-physician practitioner's note, except where noted. All available labs and Radiology studies were reviewed.  I was present for key portions of any procedure(s) performed by the resident/non-physician practitioner and I was immediately available to provide assistance.       At this point I agree with the current assessment done in the Emergency Department.  I have conducted an independent evaluation of this patient a history and physical is as follows:    ED Course         Critical Care Time  Procedures    58 yo female with hx of uti, here with increased urgency and frequency of urination. Pt with no fever, no abdominal pain, no back pain.  No n/v/d.  Vss, afebrile, lungs cta, rrr, abdomen soft nontender. Urine.  Abx.

## 2024-12-12 NOTE — ED PROVIDER NOTES
Time reflects when diagnosis was documented in both MDM as applicable and the Disposition within this note       Time User Action Codes Description Comment    12/12/2024  2:23 PM Evelin Avila Add [R35.0] Urinary frequency     12/12/2024  2:23 PM Evelin Avila Remove [R35.0] Urinary frequency     12/12/2024  2:24 PM Evelin Avila Add [N39.0] UTI (urinary tract infection)     12/12/2024  2:24 PM Evelin Avila Add [R35.0] Urinary frequency           ED Disposition       ED Disposition   Discharge    Condition   Stable    Date/Time   Thu Dec 12, 2024  2:23 PM    Comment   Chante REIS Allison discharge to home/self care.                   Assessment & Plan       Medical Decision Making  59 year old patient with hx of frequent cystitis and pyelonephritis presenting with increased urinary frequency. Admits to urinary frequency for past few years, but has worsened over the past few weeks. Recent cystitis in September successfully treated, worsening frequency since. Denies any other urinary symptoms. No signs or symptoms of pyelonephritis. Has been seen in the past by urology for frequency, no medical intervention was taken at that time.  Differential diagnosis to include: cystitis, pyelonephritis, renal stone, neurogenic bladder, overactive bladder.  UA with WBCs. Post-void residual 20mL. Patient prescribed Macrobid and pyridium. Given urogyn referral for urinary frequency.      Amount and/or Complexity of Data Reviewed  Labs:  Decision-making details documented in ED Course.    Risk  Prescription drug management.        ED Course as of 12/12/24 1500   Thu Dec 12, 2024   1418 pH, UA(!): Interference-unable to analyze   1418 Leukocytes, UA(!): Interference- unable to analyze  Suspect due to AZO usage   1418 WBC, UA(!): 4-10       Medications - No data to display    ED Risk Strat Scores                          SBIRT 20yo+      Flowsheet Row Most Recent Value   Initial Alcohol Screen: US AUDIT-C     1. How often do you have a drink  containing alcohol? 0 Filed at: 12/12/2024 1240   2. How many drinks containing alcohol do you have on a typical day you are drinking?  0 Filed at: 12/12/2024 1240   3b. FEMALE Any Age, or MALE 65+: How often do you have 4 or more drinks on one occassion? 0 Filed at: 12/12/2024 1240   Audit-C Score 0 Filed at: 12/12/2024 1240   JIM: How many times in the past year have you...    Used an illegal drug or used a prescription medication for non-medical reasons? Never Filed at: 12/12/2024 1240                            History of Present Illness       Chief Complaint   Patient presents with    Possible UTI     Per pt has hx of frequent UTIs, Pt states normally can only tell if she has a UTI by frequent urination. Today started w/ abdominal pain and even more frequent urination pt states unable to sleep or even drive 15 mins to work.        Past Medical History:   Diagnosis Date    Hypertension     Lymphedema       History reviewed. No pertinent surgical history.   History reviewed. No pertinent family history.   Social History     Tobacco Use    Smoking status: Never    Smokeless tobacco: Never   Substance Use Topics    Alcohol use: Yes     Comment: Socially    Drug use: Never      E-Cigarette/Vaping      E-Cigarette/Vaping Substances      I have reviewed and agree with the history as documented.     Patient is a 59 year old female with PMH of frequent cystitis and pyelonephritis presenting with increasing urinary frequency. Patient states that she has had urinary frequency for years, but is has been worsening since October. Recent cystitis in September which resolved with antibiotics. In October she was also evaluated for cystitis by her PCP which was negative. Frequency has worsened in the past few weeks since last being evaluated. States she urinates multiple times an hour and gets up at least five times during the night to urinate. Has taken AZO for the past two days with no relief. Today she was driving and  developed abdominal discomfort due to having to urinate. Has seen urology in the past for this issue and per patient has an overactive bladder.          Review of Systems   Constitutional:  Negative for chills and fever.   HENT:  Negative for rhinorrhea and sore throat.    Respiratory:  Negative for shortness of breath.    Cardiovascular:  Negative for chest pain and palpitations.   Gastrointestinal:  Negative for abdominal pain, diarrhea, nausea and vomiting.   Endocrine: Positive for polyuria. Negative for polydipsia and polyphagia.   Genitourinary:  Positive for frequency. Negative for difficulty urinating, dysuria, hematuria, pelvic pain, urgency, vaginal bleeding, vaginal discharge and vaginal pain.   Musculoskeletal:  Negative for myalgias.   Neurological:  Negative for syncope and headaches.   Psychiatric/Behavioral:  Negative for confusion.            Objective       ED Triage Vitals [12/12/24 1237]   Temperature Pulse Blood Pressure Respirations SpO2 Patient Position - Orthostatic VS   97.7 °F (36.5 °C) 89 (!) 160/105 18 96 % Sitting      Temp Source Heart Rate Source BP Location FiO2 (%) Pain Score    Oral Monitor Left arm -- 4      Vitals      Date and Time Temp Pulse SpO2 Resp BP Pain Score FACES Pain Rating User   12/12/24 1240 -- -- 96 % -- -- -- -- AS   12/12/24 1237 97.7 °F (36.5 °C) 89 96 % 18 160/105 4 -- AS            Physical Exam  Vitals and nursing note reviewed.   Constitutional:       General: She is not in acute distress.     Appearance: Normal appearance. She is not ill-appearing or toxic-appearing.   HENT:      Head: Normocephalic and atraumatic.      Nose: Nose normal.   Eyes:      Extraocular Movements: Extraocular movements intact.      Pupils: Pupils are equal, round, and reactive to light.   Cardiovascular:      Rate and Rhythm: Normal rate and regular rhythm.      Pulses: Normal pulses.      Heart sounds: Normal heart sounds. No murmur heard.     No friction rub. No gallop.    Pulmonary:      Effort: Pulmonary effort is normal. No respiratory distress.      Breath sounds: Normal breath sounds. No wheezing.   Abdominal:      General: Abdomen is flat. Bowel sounds are normal. There is no distension.      Palpations: Abdomen is soft.      Tenderness: There is no abdominal tenderness. There is no right CVA tenderness, left CVA tenderness, guarding or rebound.   Musculoskeletal:         General: Normal range of motion.      Cervical back: Normal range of motion and neck supple.   Skin:     General: Skin is warm and dry.      Capillary Refill: Capillary refill takes less than 2 seconds.   Neurological:      General: No focal deficit present.      Mental Status: She is alert and oriented to person, place, and time.   Psychiatric:         Mood and Affect: Mood normal.         Results Reviewed       Procedure Component Value Units Date/Time    Urine Microscopic [[id number]]  (Abnormal) Collected: 12/12/24 1315    Lab Status: Final result Specimen: Urine, Other Updated: 12/12/24 1349     RBC, UA 1-2 /hpf      WBC, UA 4-10 /hpf      Epithelial Cells Occasional /hpf      Bacteria, UA Occasional /hpf     UA w Reflex to Microscopic w Reflex to Culture [[id number]]  (Abnormal) Collected: 12/12/24 1315    Lab Status: Final result Specimen: Urine, Other Updated: 12/12/24 1345     Color, UA Dark Orange     Clarity, UA Clear     Specific Gravity, UA 1.011     pH, UA Interference-unable to analyze     Leukocytes, UA Interference- unable to analyze     Nitrite, UA Interference- unable to analyze     Protein, UA       Interference- unable to analyze     mg/dl     Glucose, UA       Interference- unable to analyze     mg/dl     Ketones, UA       Interference- unable to analyze     mg/dl     Urobilinogen, UA       Interference-unable to analyze     E.U./dl     Bilirubin, UA Interference- unable to analyze     Occult Blood, UA Interference- unable to analyze            No orders to display       Procedures    ED  Medication and Procedure Management   None     Discharge Medication List as of 12/12/2024  2:42 PM        START taking these medications    Details   nitrofurantoin (MACROBID) 100 mg capsule Take 1 capsule (100 mg total) by mouth 2 (two) times a day for 7 days, Starting Thu 12/12/2024, Until Thu 12/19/2024, Normal             ED SEPSIS DOCUMENTATION   Time reflects when diagnosis was documented in both MDM as applicable and the Disposition within this note       Time User Action Codes Description Comment    12/12/2024  2:23 PM Evelin Avila Add [R35.0] Urinary frequency     12/12/2024  2:23 PM Evelin Avila Remove [R35.0] Urinary frequency     12/12/2024  2:24 PM Evelin Avila Add [N39.0] UTI (urinary tract infection)     12/12/2024  2:24 PM Evelin Avila Add [R35.0] Urinary frequency                  Evelin Avila PA-C  12/12/24 1500       Evelin Avila PA-C  12/12/24 1500

## 2024-12-12 NOTE — Clinical Note
Chante Cooper was seen and treated in our emergency department on 12/12/2024.                Diagnosis:     Chante  may return to work on return date.    She may return on this date: 12/13/2024         If you have any questions or concerns, please don't hesitate to call.      Evelin Avila PA-C    ______________________________           _______________          _______________  Hospital Representative                              Date                                Time

## 2024-12-12 NOTE — DISCHARGE INSTRUCTIONS
Take Macrobid 100 mg twice daily for seven days. Follow up with urogynecology for ongoing urinary frequency. Return to ED if your symptoms worsen or you experience fevers, nausea, vomiting, abdominal pain, or flank pain.

## 2025-02-18 ENCOUNTER — TELEPHONE (OUTPATIENT)
Dept: SCHEDULING | Facility: CLINIC | Age: 60
End: 2025-02-18

## 2025-02-18 NOTE — TELEPHONE ENCOUNTER
New Patient Appointment Request    Name of caller: Preeti Hyde    Reason for Visit: routine    Insurance: [facility]    Insurance ID #: [id number]    Recent Cardiac Test/Procedures: none    Referred by: self    Primary Care Physician: #[id number], [facility] Provider    Previous Cardiologist name and phone number: Dr. Guicho Hernandez contact number: [phone]    Additional notes/History: pt scheduled for 5/15/2025

## 2025-02-19 ENCOUNTER — APPOINTMENT (OUTPATIENT)
Dept: URBAN - NONMETROPOLITAN AREA CLINIC 40 | Age: 60
Setting detail: DERMATOLOGY
End: 2025-02-20

## 2025-02-19 DIAGNOSIS — L82.1 OTHER SEBORRHEIC KERATOSIS: ICD-10-CM

## 2025-02-19 DIAGNOSIS — I87.2 VENOUS INSUFFICIENCY (CHRONIC) (PERIPHERAL): ICD-10-CM

## 2025-02-19 DIAGNOSIS — D17 BENIGN LIPOMATOUS NEOPLASM: ICD-10-CM

## 2025-02-19 DIAGNOSIS — L81.4 OTHER MELANIN HYPERPIGMENTATION: ICD-10-CM

## 2025-02-19 DIAGNOSIS — L70.0 ACNE VULGARIS: ICD-10-CM

## 2025-02-19 DIAGNOSIS — D18.0 HEMANGIOMA: ICD-10-CM

## 2025-02-19 DIAGNOSIS — D22 MELANOCYTIC NEVI: ICD-10-CM

## 2025-02-19 DIAGNOSIS — L20.89 OTHER ATOPIC DERMATITIS: ICD-10-CM

## 2025-02-19 PROBLEM — D18.01 HEMANGIOMA OF SKIN AND SUBCUTANEOUS TISSUE: Status: ACTIVE | Noted: 2025-02-19

## 2025-02-19 PROBLEM — D22.71 MELANOCYTIC NEVI OF RIGHT LOWER LIMB, INCLUDING HIP: Status: ACTIVE | Noted: 2025-02-19

## 2025-02-19 PROBLEM — D17.1 BENIGN LIPOMATOUS NEOPLASM OF SKIN AND SUBCUTANEOUS TISSUE OF TRUNK: Status: ACTIVE | Noted: 2025-02-19

## 2025-02-19 PROCEDURE — OTHER PRESCRIPTION: OTHER

## 2025-02-19 PROCEDURE — OTHER PRESCRIPTION MEDICATION MANAGEMENT: OTHER

## 2025-02-19 PROCEDURE — 99214 OFFICE O/P EST MOD 30 MIN: CPT

## 2025-02-19 PROCEDURE — OTHER REASSURANCE: OTHER

## 2025-02-19 PROCEDURE — OTHER OBSERVATION: OTHER

## 2025-02-19 PROCEDURE — OTHER OTC TREATMENT REGIMEN: OTHER

## 2025-02-19 PROCEDURE — OTHER COUNSELING: OTHER

## 2025-02-19 PROCEDURE — OTHER ADDITIONAL NOTES: OTHER

## 2025-02-19 RX ORDER — CLINDAMYCIN PHOSPHATE 10 MG/ML
LOTION TOPICAL
Qty: 180 | Refills: 3 | Status: ERX

## 2025-02-19 RX ORDER — TRIAMCINOLONE ACETONIDE 1 MG/G
CREAM TOPICAL
Qty: 454 | Refills: 3 | Status: ERX

## 2025-02-19 RX ORDER — SULFACETAMIDE SODIUM AND SULFUR 10; 5 MG/G; MG/G
RINSE TOPICAL
Qty: 1020.59 | Refills: 3 | Status: ERX

## 2025-02-19 ASSESSMENT — LOCATION DETAILED DESCRIPTION DERM
LOCATION DETAILED: RIGHT SUPERIOR UPPER BACK
LOCATION DETAILED: LEFT CENTRAL MALAR CHEEK
LOCATION DETAILED: RIGHT DISTAL PRETIBIAL REGION
LOCATION DETAILED: SUPERIOR THORACIC SPINE
LOCATION DETAILED: RIGHT DORSAL 3RD TOE
LOCATION DETAILED: RIGHT RADIAL DORSAL HAND
LOCATION DETAILED: LEFT DISTAL PRETIBIAL REGION
LOCATION DETAILED: LEFT SUPERIOR UPPER BACK
LOCATION DETAILED: RIGHT MEDIAL SUPERIOR CHEST
LOCATION DETAILED: LEFT SUPERIOR LATERAL MIDBACK
LOCATION DETAILED: RIGHT INFERIOR CENTRAL MALAR CHEEK

## 2025-02-19 ASSESSMENT — LOCATION SIMPLE DESCRIPTION DERM
LOCATION SIMPLE: LEFT PRETIBIAL REGION
LOCATION SIMPLE: UPPER BACK
LOCATION SIMPLE: LEFT UPPER BACK
LOCATION SIMPLE: RIGHT 3RD TOE
LOCATION SIMPLE: RIGHT PRETIBIAL REGION
LOCATION SIMPLE: CHEST
LOCATION SIMPLE: RIGHT UPPER BACK
LOCATION SIMPLE: LEFT CHEEK
LOCATION SIMPLE: LEFT LOWER BACK
LOCATION SIMPLE: RIGHT CHEEK
LOCATION SIMPLE: RIGHT HAND

## 2025-02-19 ASSESSMENT — LOCATION ZONE DERM
LOCATION ZONE: TRUNK
LOCATION ZONE: FACE
LOCATION ZONE: LEG
LOCATION ZONE: TOE
LOCATION ZONE: HAND

## 2025-02-19 ASSESSMENT — BSA ECZEMA: % BODY COVERED IN ECZEMA: 2

## 2025-02-19 ASSESSMENT — ITCH NUMERIC RATING SCALE: HOW SEVERE IS YOUR ITCHING?: 0

## 2025-02-19 NOTE — PROCEDURE: PRESCRIPTION MEDICATION MANAGEMENT
Render In Strict Bullet Format?: No
Detail Level: Zone
Continue Regimen: Clindamycin & sulfa wash
Continue Regimen: Triamcinolone

## 2025-05-01 ENCOUNTER — TELEPHONE (OUTPATIENT)
Dept: CARDIOLOGY | Facility: CLINIC | Age: 60
End: 2025-05-01
Payer: OTHER GOVERNMENT

## 2025-07-16 ENCOUNTER — RX ONLY (RX ONLY)
Age: 60
End: 2025-07-16

## 2025-07-16 RX ORDER — MINOCYCLINE HYDROCHLORIDE 100 MG/1
TABLET ORAL
Qty: 90 | Refills: 0 | Status: ERX | COMMUNITY
Start: 2025-07-16

## 2025-08-15 ENCOUNTER — OFFICE VISIT (OUTPATIENT)
Dept: CARDIOLOGY | Facility: CLINIC | Age: 60
End: 2025-08-15
Payer: OTHER GOVERNMENT

## 2025-08-15 VITALS
SYSTOLIC BLOOD PRESSURE: 140 MMHG | HEIGHT: 68 IN | WEIGHT: 240 LBS | HEART RATE: 89 BPM | DIASTOLIC BLOOD PRESSURE: 82 MMHG | BODY MASS INDEX: 36.37 KG/M2 | OXYGEN SATURATION: 97 %

## 2025-08-15 DIAGNOSIS — I25.10 CORONARY ARTERY DISEASE INVOLVING NATIVE CORONARY ARTERY OF NATIVE HEART WITHOUT ANGINA PECTORIS: Primary | ICD-10-CM

## 2025-08-15 DIAGNOSIS — Z82.49 FAMILY HISTORY OF EARLY CAD: ICD-10-CM

## 2025-08-15 DIAGNOSIS — R07.89 OTHER CHEST PAIN: ICD-10-CM

## 2025-08-15 DIAGNOSIS — I10 ESSENTIAL HYPERTENSION: ICD-10-CM

## 2025-08-15 DIAGNOSIS — I89.0 LYMPHEDEMA: ICD-10-CM

## 2025-08-15 DIAGNOSIS — R06.09 OTHER FORMS OF DYSPNEA: ICD-10-CM

## 2025-08-15 DIAGNOSIS — E78.00 PURE HYPERCHOLESTEROLEMIA: ICD-10-CM

## 2025-08-15 DIAGNOSIS — R94.31 ABNORMAL EKG: ICD-10-CM

## 2025-08-15 LAB
ATRIAL RATE: 82
P AXIS: 72
PR INTERVAL: 168
QRS DURATION: 96
QT INTERVAL: 394
QTC CALCULATION(BAZETT): 460
R AXIS: -45
T WAVE AXIS: 71
VENTRICULAR RATE: 82

## 2025-08-15 PROCEDURE — 99205 OFFICE O/P NEW HI 60 MIN: CPT | Performed by: INTERNAL MEDICINE

## 2025-08-15 PROCEDURE — 93000 ELECTROCARDIOGRAM COMPLETE: CPT | Performed by: INTERNAL MEDICINE

## 2025-08-15 RX ORDER — PANTOPRAZOLE SODIUM 40 MG/1
40 TABLET, DELAYED RELEASE ORAL DAILY
COMMUNITY

## 2025-08-15 RX ORDER — MIRABEGRON 50 MG/1
50 TABLET, FILM COATED, EXTENDED RELEASE ORAL DAILY
COMMUNITY
Start: 2025-02-24

## 2025-08-15 RX ORDER — ATORVASTATIN CALCIUM 40 MG/1
40 TABLET, FILM COATED ORAL DAILY
Qty: 90 TABLET | Refills: 1 | Status: SHIPPED | OUTPATIENT
Start: 2025-08-15 | End: 2026-02-11

## 2025-08-15 RX ORDER — LEVOCETIRIZINE DIHYDROCHLORIDE 5 MG/1
5 TABLET, FILM COATED ORAL SEE ADMIN INSTRUCTIONS
COMMUNITY

## 2025-08-18 ENCOUNTER — TELEPHONE (OUTPATIENT)
Dept: SCHEDULING | Facility: CLINIC | Age: 60
End: 2025-08-18
Payer: OTHER GOVERNMENT

## 2025-09-03 DIAGNOSIS — E78.5 HYPERLIPIDEMIA, UNSPECIFIED HYPERLIPIDEMIA TYPE: ICD-10-CM

## 2025-09-03 DIAGNOSIS — I25.10 CORONARY ARTERY DISEASE INVOLVING NATIVE CORONARY ARTERY OF NATIVE HEART WITHOUT ANGINA PECTORIS: Primary | ICD-10-CM

## 2025-09-03 RX ORDER — METOPROLOL TARTRATE 50 MG/1
50 TABLET ORAL SEE ADMIN INSTRUCTIONS
Qty: 2 TABLET | Refills: 0 | Status: SHIPPED | OUTPATIENT
Start: 2025-09-03